# Patient Record
Sex: FEMALE | Race: WHITE | Employment: OTHER | URBAN - NONMETROPOLITAN AREA
[De-identification: names, ages, dates, MRNs, and addresses within clinical notes are randomized per-mention and may not be internally consistent; named-entity substitution may affect disease eponyms.]

---

## 2017-09-06 ENCOUNTER — DOCTOR'S OFFICE (OUTPATIENT)
Dept: URBAN - NONMETROPOLITAN AREA CLINIC 1 | Facility: CLINIC | Age: 77
Setting detail: OPHTHALMOLOGY
End: 2017-09-06
Payer: COMMERCIAL

## 2017-09-06 DIAGNOSIS — H04.123: ICD-10-CM

## 2017-09-06 DIAGNOSIS — H02.052: ICD-10-CM

## 2017-09-06 DIAGNOSIS — H40.003: ICD-10-CM

## 2017-09-06 DIAGNOSIS — H43.813: ICD-10-CM

## 2017-09-06 DIAGNOSIS — H04.122: ICD-10-CM

## 2017-09-06 DIAGNOSIS — H35.3131: ICD-10-CM

## 2017-09-06 PROCEDURE — 92250 FUNDUS PHOTOGRAPHY W/I&R: CPT | Performed by: OPHTHALMOLOGY

## 2017-09-06 PROCEDURE — 83861 MICROFLUID ANALY TEARS: CPT | Performed by: OPHTHALMOLOGY

## 2017-09-06 PROCEDURE — 68761 CLOSE TEAR DUCT OPENING: CPT | Performed by: OPHTHALMOLOGY

## 2017-09-06 PROCEDURE — 67820 REVISE EYELASHES: CPT | Performed by: OPHTHALMOLOGY

## 2017-09-06 PROCEDURE — 92014 COMPRE OPH EXAM EST PT 1/>: CPT | Performed by: OPHTHALMOLOGY

## 2017-09-06 PROCEDURE — 92134 CPTRZ OPH DX IMG PST SGM RTA: CPT | Performed by: OPHTHALMOLOGY

## 2017-09-06 PROCEDURE — 76514 ECHO EXAM OF EYE THICKNESS: CPT | Performed by: OPHTHALMOLOGY

## 2017-09-06 ASSESSMENT — REFRACTION_CURRENTRX
OS_VPRISM_DIRECTION: PROGS
OD_CYLINDER: -1.00
OD_OVR_VA: 20/
OD_SPHERE: +0.25
OS_AXIS: 82
OD_ADD: +2.50
OS_OVR_VA: 20/
OS_ADD: +2.50
OS_CYLINDER: -1.25
OS_SPHERE: PLANO
OD_OVR_VA: 20/
OD_VPRISM_DIRECTION: PROGS
OD_OVR_VA: 20/
OD_AXIS: 85

## 2017-09-06 ASSESSMENT — VISUAL ACUITY
OD_BCVA: 20/25
OS_BCVA: 20/25-2

## 2017-09-06 ASSESSMENT — REFRACTION_MANIFEST
OD_VA1: 20/
OS_VA2: 20/
OS_VA2: 20/25
OS_VA3: 20/
OS_VA1: 20/25-2
OD_VA3: 20/
OD_CYLINDER: -1.00
OS_VA3: 20/
OS_VA1: 20/
OD_ADD: +2.50
OD_VA3: 20/
OU_VA: 20/
OS_AXIS: 085
OS_VA2: 20/
OS_VA3: 20/
OS_CYLINDER: -1.00
OD_VA3: 20/
OD_AXIS: 080
OS_ADD: +2.50
OD_VA1: 20/25-2
OU_VA: 20/
OD_VA2: 20/25
OD_VA1: 20/
OS_VA1: 20/
OD_SPHERE: +0.25
OU_VA: 20/
OS_SPHERE: PLANO
OD_VA2: 20/
OD_VA2: 20/

## 2017-09-06 ASSESSMENT — REFRACTION_AUTOREFRACTION
OS_CYLINDER: -2.50
OS_AXIS: 83
OD_CYLINDER: -2.25
OD_SPHERE: +1.25
OS_SPHERE: +1.75
OD_AXIS: 85

## 2017-09-06 ASSESSMENT — SPHEQUIV_DERIVED
OD_SPHEQUIV: 0.125
OD_SPHEQUIV: -0.25
OS_SPHEQUIV: 0.5

## 2017-09-06 ASSESSMENT — PACHYMETRY
OS_CT_CORRECTION: 1
OD_CT_CORRECTION: 1
OS_CT_UM: 527
OD_CT_UM: 531

## 2017-09-06 ASSESSMENT — DRY EYES - PHYSICIAN NOTES
OD_GENERALCOMMENTS: K SICCA MILD
OS_GENERALCOMMENTS: K SICCA MILD

## 2017-09-06 ASSESSMENT — CONFRONTATIONAL VISUAL FIELD TEST (CVF)
OD_FINDINGS: FULL
OS_FINDINGS: FULL

## 2017-09-06 ASSESSMENT — LID POSITION - DERMATOCHALASIS
OS_DERMATOCHALASIS: LUL 1+
OD_DERMATOCHALASIS: RUL 1+

## 2017-09-06 ASSESSMENT — CORNEAL DYSTROPHY - BAND KERATOPATHY
OD_BANDKERATOPATHY: 1+
OS_BANDKERATOPATHY: 1+

## 2017-09-06 ASSESSMENT — SUPERFICIAL PUNCTATE KERATITIS (SPK)
OD_SPK: 2+
OS_SPK: 2+

## 2018-03-06 ENCOUNTER — DOCTOR'S OFFICE (OUTPATIENT)
Dept: URBAN - NONMETROPOLITAN AREA CLINIC 1 | Facility: CLINIC | Age: 78
Setting detail: OPHTHALMOLOGY
End: 2018-03-06
Payer: COMMERCIAL

## 2018-03-06 DIAGNOSIS — H04.123: ICD-10-CM

## 2018-03-06 DIAGNOSIS — H40.013: ICD-10-CM

## 2018-03-06 DIAGNOSIS — H04.122: ICD-10-CM

## 2018-03-06 DIAGNOSIS — H04.121: ICD-10-CM

## 2018-03-06 DIAGNOSIS — H35.3131: ICD-10-CM

## 2018-03-06 DIAGNOSIS — H43.813: ICD-10-CM

## 2018-03-06 DIAGNOSIS — H02.052: ICD-10-CM

## 2018-03-06 DIAGNOSIS — H27.8: ICD-10-CM

## 2018-03-06 PROCEDURE — 92134 CPTRZ OPH DX IMG PST SGM RTA: CPT | Performed by: OPHTHALMOLOGY

## 2018-03-06 PROCEDURE — 83861 MICROFLUID ANALY TEARS: CPT | Performed by: OPHTHALMOLOGY

## 2018-03-06 PROCEDURE — 92083 EXTENDED VISUAL FIELD XM: CPT | Performed by: OPHTHALMOLOGY

## 2018-03-06 PROCEDURE — 92014 COMPRE OPH EXAM EST PT 1/>: CPT | Performed by: OPHTHALMOLOGY

## 2018-03-06 ASSESSMENT — REFRACTION_CURRENTRX
OD_OVR_VA: 20/
OD_OVR_VA: 20/
OD_ADD: +2.50
OD_AXIS: 85
OD_SPHERE: +0.25
OD_VPRISM_DIRECTION: PROGS
OS_AXIS: 82
OD_CYLINDER: -1.00
OS_OVR_VA: 20/
OS_SPHERE: PLANO
OS_CYLINDER: -1.25
OS_OVR_VA: 20/
OD_OVR_VA: 20/
OS_VPRISM_DIRECTION: PROGS
OS_OVR_VA: 20/
OS_ADD: +2.50

## 2018-03-06 ASSESSMENT — LID POSITION - DERMATOCHALASIS
OD_DERMATOCHALASIS: RUL 1+
OS_DERMATOCHALASIS: LUL 1+

## 2018-03-06 ASSESSMENT — REFRACTION_MANIFEST
OS_VA1: 20/
OD_CYLINDER: -1.00
OD_SPHERE: +0.25
OD_VA1: 20/25-2
OS_VA3: 20/
OS_VA2: 20/
OS_VA2: 20/
OS_ADD: +2.50
OD_VA2: 20/25
OD_ADD: +2.50
OS_VA1: 20/25-2
OU_VA: 20/
OD_AXIS: 080
OD_VA2: 20/
OS_SPHERE: PLANO
OS_AXIS: 085
OD_VA3: 20/
OU_VA: 20/
OD_VA3: 20/
OD_VA3: 20/
OS_VA3: 20/
OS_VA1: 20/
OD_VA2: 20/
OS_VA2: 20/25
OD_VA1: 20/
OS_CYLINDER: -1.00
OD_VA1: 20/
OU_VA: 20/
OS_VA3: 20/

## 2018-03-06 ASSESSMENT — CORNEAL DYSTROPHY - BAND KERATOPATHY
OD_BANDKERATOPATHY: 1+
OS_BANDKERATOPATHY: 1+

## 2018-03-06 ASSESSMENT — REFRACTION_AUTOREFRACTION
OS_SPHERE: +2.25
OD_SPHERE: +1.75
OD_CYLINDER: -2.00
OS_CYLINDER: -1.75
OS_AXIS: 088
OD_AXIS: 080

## 2018-03-06 ASSESSMENT — SPHEQUIV_DERIVED
OD_SPHEQUIV: -0.25
OD_SPHEQUIV: 0.75
OS_SPHEQUIV: 1.375

## 2018-03-06 ASSESSMENT — DRY EYES - PHYSICIAN NOTES
OS_GENERALCOMMENTS: K SICCA MILD
OD_GENERALCOMMENTS: K SICCA MILD

## 2018-03-06 ASSESSMENT — VISUAL ACUITY
OD_BCVA: 20/40
OS_BCVA: 20/30

## 2018-03-06 ASSESSMENT — CONFRONTATIONAL VISUAL FIELD TEST (CVF)
OD_FINDINGS: FULL
OS_FINDINGS: FULL

## 2018-03-06 ASSESSMENT — SUPERFICIAL PUNCTATE KERATITIS (SPK)
OS_SPK: 2+
OD_SPK: 2+

## 2018-09-19 ENCOUNTER — DOCTOR'S OFFICE (OUTPATIENT)
Dept: URBAN - NONMETROPOLITAN AREA CLINIC 1 | Facility: CLINIC | Age: 78
Setting detail: OPHTHALMOLOGY
End: 2018-09-19
Payer: COMMERCIAL

## 2018-09-19 DIAGNOSIS — H40.013: ICD-10-CM

## 2018-09-19 DIAGNOSIS — H04.123: ICD-10-CM

## 2018-09-19 DIAGNOSIS — H35.3131: ICD-10-CM

## 2018-09-19 DIAGNOSIS — H43.813: ICD-10-CM

## 2018-09-19 PROBLEM — H02.052 TRICHIASIS, WITHOUT ENTROPION; RIGHT LOWER LID: Status: RESOLVED | Noted: 2017-09-06 | Resolved: 2018-09-19

## 2018-09-19 PROCEDURE — 92014 COMPRE OPH EXAM EST PT 1/>: CPT | Performed by: OPHTHALMOLOGY

## 2018-09-19 PROCEDURE — 92133 CPTRZD OPH DX IMG PST SGM ON: CPT | Performed by: OPHTHALMOLOGY

## 2018-09-19 ASSESSMENT — REFRACTION_AUTOREFRACTION
OD_AXIS: 177
OD_SPHERE: +3.50
OS_CYLINDER: -1.50
OS_SPHERE: +3.00
OD_CYLINDER: -1.25
OS_AXIS: 070

## 2018-09-19 ASSESSMENT — REFRACTION_MANIFEST
OS_VA2: 20/
OD_VA3: 20/
OS_VA1: 20/
OD_VA1: 20/25-2
OD_VA2: 20/25
OS_ADD: +2.50
OS_VA3: 20/
OD_VA1: 20/
OU_VA: 20/
OD_CYLINDER: -1.00
OS_CYLINDER: -1.00
OD_ADD: +2.50
OS_VA2: 20/25
OS_AXIS: 085
OD_VA2: 20/
OU_VA: 20/
OS_VA1: 20/25-2
OS_VA3: 20/
OD_VA3: 20/
OD_AXIS: 080
OD_SPHERE: +0.25
OS_SPHERE: PLANO

## 2018-09-19 ASSESSMENT — VISUAL ACUITY
OS_BCVA: 20/50+2
OD_BCVA: 20/60-1

## 2018-09-19 ASSESSMENT — CORNEAL DYSTROPHY - BAND KERATOPATHY
OS_BANDKERATOPATHY: 1+
OD_BANDKERATOPATHY: 1+

## 2018-09-19 ASSESSMENT — SUPERFICIAL PUNCTATE KERATITIS (SPK)
OS_SPK: 2+
OD_SPK: 2+

## 2018-09-19 ASSESSMENT — REFRACTION_CURRENTRX
OD_OVR_VA: 20/
OS_OVR_VA: 20/
OD_VPRISM_DIRECTION: PROGS
OS_SPHERE: PLANO
OD_SPHERE: +0.25
OS_OVR_VA: 20/
OS_CYLINDER: -1.25
OS_ADD: +2.50
OD_OVR_VA: 20/
OS_VPRISM_DIRECTION: PROGS
OS_AXIS: 82
OD_AXIS: 85
OD_ADD: +2.50
OS_OVR_VA: 20/
OD_OVR_VA: 20/
OD_CYLINDER: -1.00

## 2018-09-19 ASSESSMENT — CORNEAL DYSTROPHY
OD_DYSTROPHY: ARCUS SENILIS
OS_DYSTROPHY: ARCUS SENILIS

## 2018-09-19 ASSESSMENT — SPHEQUIV_DERIVED
OD_SPHEQUIV: 2.875
OD_SPHEQUIV: -0.25
OS_SPHEQUIV: 2.25

## 2018-09-19 ASSESSMENT — CONFRONTATIONAL VISUAL FIELD TEST (CVF)
OS_FINDINGS: FULL
OD_FINDINGS: FULL

## 2018-09-19 ASSESSMENT — DRY EYES - PHYSICIAN NOTES
OS_GENERALCOMMENTS: K SICCA MILD
OD_GENERALCOMMENTS: K SICCA MILD

## 2018-09-19 ASSESSMENT — LID POSITION - DERMATOCHALASIS
OS_DERMATOCHALASIS: LUL 1+
OD_DERMATOCHALASIS: RUL 1+

## 2018-09-21 ENCOUNTER — OPTICAL (OUTPATIENT)
Dept: URBAN - NONMETROPOLITAN AREA CLINIC 6 | Facility: CLINIC | Age: 78
Setting detail: OPHTHALMOLOGY
End: 2018-09-21
Payer: COMMERCIAL

## 2018-09-21 ENCOUNTER — DOCTOR'S OFFICE (OUTPATIENT)
Dept: URBAN - NONMETROPOLITAN AREA CLINIC 1 | Facility: CLINIC | Age: 78
Setting detail: OPHTHALMOLOGY
End: 2018-09-21

## 2018-09-21 DIAGNOSIS — H52.4: ICD-10-CM

## 2018-09-21 DIAGNOSIS — H27.8: ICD-10-CM

## 2018-09-21 DIAGNOSIS — Z96.1: ICD-10-CM

## 2018-09-21 PROCEDURE — V2203 LENS SPHCYL BIFOCAL 4.00D/.1: HCPCS | Performed by: OPTOMETRIST

## 2018-09-21 PROCEDURE — V2781 PROGRESSIVE LENS PER LENS: HCPCS | Performed by: OPTOMETRIST

## 2018-09-21 PROCEDURE — V2020 VISION SVCS FRAMES PURCHASES: HCPCS | Performed by: OPTOMETRIST

## 2018-09-21 PROCEDURE — 92015 DETERMINE REFRACTIVE STATE: CPT | Performed by: OPTOMETRIST

## 2018-09-21 ASSESSMENT — SPHEQUIV_DERIVED
OS_SPHEQUIV: 0.125
OD_SPHEQUIV: 0.625
OS_SPHEQUIV: 0
OD_SPHEQUIV: 1

## 2018-09-21 ASSESSMENT — REFRACTION_MANIFEST
OD_CYLINDER: -1.75
OS_CYLINDER: -1.00
OD_VA3: 20/
OS_ADD: +2.50
OS_AXIS: 085
OS_VA1: 20/25-2
OD_ADD: +2.50
OS_VA2: 20/25
OS_VA3: 20/
OS_VA3: 20/
OD_SPHERE: +1.50
OU_VA: 20/
OD_VA3: 20/
OD_VA1: 20/30-2
OD_VA2: 20/30
OD_AXIS: 095
OS_VA2: 20/
OD_VA1: 20/
OD_VA2: 20/
OU_VA: 20/
OS_VA1: 20/
OS_SPHERE: +0.50

## 2018-09-21 ASSESSMENT — KERATOMETRY
OS_K1POWER_DIOPTERS: 42.25
OS_AXISANGLE_DEGREES: 089
OD_AXISANGLE_DEGREES: 063
OS_K2POWER_DIOPTERS: 43.75
OD_K2POWER_DIOPTERS: 43.50
OD_K1POWER_DIOPTERS: 43.00

## 2018-09-21 ASSESSMENT — REFRACTION_AUTOREFRACTION
OS_CYLINDER: -1.25
OD_SPHERE: +2.50
OS_AXIS: 078
OS_SPHERE: +0.75
OD_AXIS: 099
OD_CYLINDER: -3.00

## 2018-09-21 ASSESSMENT — REFRACTION_CURRENTRX
OS_SPHERE: PLANO
OS_VPRISM_DIRECTION: PROGS
OS_OVR_VA: 20/
OD_OVR_VA: 20/
OS_OVR_VA: 20/
OD_SPHERE: +0.25
OS_OVR_VA: 20/
OS_ADD: +2.50
OD_AXIS: 080
OD_ADD: +2.50
OD_OVR_VA: 20/
OS_AXIS: 085
OS_CYLINDER: -100.00
OD_CYLINDER: -1.00
OD_VPRISM_DIRECTION: PROGS
OD_OVR_VA: 20/

## 2018-09-21 ASSESSMENT — AXIALLENGTH_DERIVED
OD_AL: 23.4412
OS_AL: 23.7276
OD_AL: 23.2978
OS_AL: 23.777

## 2018-09-21 ASSESSMENT — VISUAL ACUITY
OD_BCVA: 20/30
OS_BCVA: 20/30

## 2018-11-20 ENCOUNTER — DOCTOR'S OFFICE (OUTPATIENT)
Dept: URBAN - NONMETROPOLITAN AREA CLINIC 1 | Facility: CLINIC | Age: 78
Setting detail: OPHTHALMOLOGY
End: 2018-11-20
Payer: COMMERCIAL

## 2018-11-20 DIAGNOSIS — H04.123: ICD-10-CM

## 2018-11-20 DIAGNOSIS — H04.121: ICD-10-CM

## 2018-11-20 DIAGNOSIS — H40.013: ICD-10-CM

## 2018-11-20 DIAGNOSIS — H35.3131: ICD-10-CM

## 2018-11-20 DIAGNOSIS — H04.122: ICD-10-CM

## 2018-11-20 DIAGNOSIS — H43.813: ICD-10-CM

## 2018-11-20 PROCEDURE — 92083 EXTENDED VISUAL FIELD XM: CPT | Performed by: OPHTHALMOLOGY

## 2018-11-20 PROCEDURE — 92014 COMPRE OPH EXAM EST PT 1/>: CPT | Performed by: OPHTHALMOLOGY

## 2018-11-20 PROCEDURE — 92134 CPTRZ OPH DX IMG PST SGM RTA: CPT | Performed by: OPHTHALMOLOGY

## 2018-11-20 PROCEDURE — 83861 MICROFLUID ANALY TEARS: CPT | Performed by: OPHTHALMOLOGY

## 2018-11-20 PROCEDURE — 76514 ECHO EXAM OF EYE THICKNESS: CPT | Performed by: OPHTHALMOLOGY

## 2018-11-20 ASSESSMENT — PACHYMETRY
OD_CT_CORRECTION: 1
OD_CT_UM: 531
OS_CT_UM: 527
OS_CT_CORRECTION: 1

## 2018-11-20 ASSESSMENT — REFRACTION_MANIFEST
OD_CYLINDER: -1.75
OS_VA2: 20/
OS_VA2: 20/25
OD_AXIS: 095
OU_VA: 20/
OD_VA2: 20/
OS_VA3: 20/
OS_SPHERE: +0.50
OS_CYLINDER: -1.00
OS_VA1: 20/25-2
OD_VA1: 20/30-2
OU_VA: 20/
OD_VA3: 20/
OS_ADD: +2.50
OD_VA3: 20/
OS_VA3: 20/
OD_VA1: 20/
OD_ADD: +2.50
OS_AXIS: 085
OS_VA1: 20/
OD_VA2: 20/30
OD_SPHERE: +1.50

## 2018-11-20 ASSESSMENT — REFRACTION_CURRENTRX
OS_OVR_VA: 20/
OS_AXIS: 085
OS_OVR_VA: 20/
OS_ADD: +2.50
OS_SPHERE: +0.50
OD_OVR_VA: 20/
OD_OVR_VA: 20/
OS_OVR_VA: 20/
OD_AXIS: 095
OD_ADD: +2.50
OS_CYLINDER: -1.00
OD_CYLINDER: -1.75
OD_VPRISM_DIRECTION: PROGS
OD_OVR_VA: 20/
OD_SPHERE: +1.50
OS_VPRISM_DIRECTION: PROGS

## 2018-11-20 ASSESSMENT — VISUAL ACUITY
OS_BCVA: 20/30
OD_BCVA: 20/30+2

## 2018-11-20 ASSESSMENT — SPHEQUIV_DERIVED
OD_SPHEQUIV: 3.25
OS_SPHEQUIV: 0
OS_SPHEQUIV: 0.125
OD_SPHEQUIV: 0.625

## 2018-11-20 ASSESSMENT — KERATOMETRY
OS_K2POWER_DIOPTERS: 43.75
OD_K2POWER_DIOPTERS: 43.50
OD_AXISANGLE_DEGREES: 063
OD_K1POWER_DIOPTERS: 43.00
OS_AXISANGLE_DEGREES: 089
OS_K1POWER_DIOPTERS: 42.25

## 2018-11-20 ASSESSMENT — SUPERFICIAL PUNCTATE KERATITIS (SPK)
OS_SPK: 2+
OD_SPK: 2+

## 2018-11-20 ASSESSMENT — REFRACTION_AUTOREFRACTION
OS_SPHERE: +0.75
OD_AXIS: 062
OD_CYLINDER: -3.00
OS_CYLINDER: -1.25
OD_SPHERE: +4.75
OS_AXIS: 078

## 2018-11-20 ASSESSMENT — AXIALLENGTH_DERIVED
OS_AL: 23.777
OD_AL: 23.4412
OD_AL: 22.4732
OS_AL: 23.7276

## 2018-11-20 ASSESSMENT — CONFRONTATIONAL VISUAL FIELD TEST (CVF)
OS_FINDINGS: FULL
OD_FINDINGS: FULL

## 2018-11-20 ASSESSMENT — CORNEAL DYSTROPHY - BAND KERATOPATHY
OD_BANDKERATOPATHY: 1+
OS_BANDKERATOPATHY: 1+

## 2018-11-20 ASSESSMENT — LID POSITION - DERMATOCHALASIS
OD_DERMATOCHALASIS: RUL 1+
OS_DERMATOCHALASIS: LUL 1+

## 2018-11-20 ASSESSMENT — CORNEAL DYSTROPHY
OD_DYSTROPHY: ARCUS SENILIS
OS_DYSTROPHY: ARCUS SENILIS

## 2018-11-20 ASSESSMENT — DRY EYES - PHYSICIAN NOTES
OS_GENERALCOMMENTS: K SICCA MILD
OD_GENERALCOMMENTS: K SICCA MILD

## 2018-12-19 ENCOUNTER — DOCTOR'S OFFICE (OUTPATIENT)
Dept: URBAN - NONMETROPOLITAN AREA CLINIC 1 | Facility: CLINIC | Age: 78
Setting detail: OPHTHALMOLOGY
End: 2018-12-19
Payer: COMMERCIAL

## 2018-12-19 DIAGNOSIS — H04.123: ICD-10-CM

## 2018-12-19 DIAGNOSIS — H02.831: ICD-10-CM

## 2018-12-19 DIAGNOSIS — H43.813: ICD-10-CM

## 2018-12-19 DIAGNOSIS — H27.8: ICD-10-CM

## 2018-12-19 DIAGNOSIS — H40.013: ICD-10-CM

## 2018-12-19 DIAGNOSIS — H04.122: ICD-10-CM

## 2018-12-19 DIAGNOSIS — H04.121: ICD-10-CM

## 2018-12-19 PROCEDURE — 83861 MICROFLUID ANALY TEARS: CPT | Performed by: OPHTHALMOLOGY

## 2018-12-19 PROCEDURE — 68761 CLOSE TEAR DUCT OPENING: CPT | Performed by: OPHTHALMOLOGY

## 2018-12-19 PROCEDURE — 92012 INTRM OPH EXAM EST PATIENT: CPT | Performed by: OPHTHALMOLOGY

## 2018-12-19 ASSESSMENT — SUPERFICIAL PUNCTATE KERATITIS (SPK)
OD_SPK: 2+
OS_SPK: 2+

## 2018-12-19 ASSESSMENT — SPHEQUIV_DERIVED
OS_SPHEQUIV: 0
OD_SPHEQUIV: 0.625
OS_SPHEQUIV: 0.125
OD_SPHEQUIV: 3.25

## 2018-12-19 ASSESSMENT — REFRACTION_MANIFEST
OS_VA3: 20/
OS_VA2: 20/25
OS_AXIS: 085
OD_CYLINDER: -1.75
OS_VA3: 20/
OD_SPHERE: +1.50
OD_ADD: +2.50
OD_VA3: 20/
OD_VA1: 20/
OU_VA: 20/
OS_CYLINDER: -1.00
OD_AXIS: 095
OD_VA2: 20/30
OS_VA2: 20/
OS_ADD: +2.50
OU_VA: 20/
OS_VA1: 20/
OS_VA1: 20/25-2
OD_VA2: 20/
OD_VA3: 20/
OD_VA1: 20/30-2
OS_SPHERE: +0.50

## 2018-12-19 ASSESSMENT — DRY EYES - PHYSICIAN NOTES
OD_GENERALCOMMENTS: K SICCA MILD
OS_GENERALCOMMENTS: K SICCA MILD

## 2018-12-19 ASSESSMENT — REFRACTION_CURRENTRX
OD_OVR_VA: 20/
OD_VPRISM_DIRECTION: PROGS
OS_OVR_VA: 20/
OS_ADD: +2.50
OS_CYLINDER: -1.00
OD_ADD: +2.50
OD_SPHERE: +1.50
OD_OVR_VA: 20/
OD_OVR_VA: 20/
OD_AXIS: 095
OS_OVR_VA: 20/
OS_SPHERE: +0.50
OD_CYLINDER: -1.75
OS_AXIS: 085
OS_VPRISM_DIRECTION: PROGS
OS_OVR_VA: 20/

## 2018-12-19 ASSESSMENT — CORNEAL DYSTROPHY
OD_DYSTROPHY: ARCUS SENILIS
OS_DYSTROPHY: ARCUS SENILIS

## 2018-12-19 ASSESSMENT — VISUAL ACUITY
OD_BCVA: 20/30
OS_BCVA: 20/30-2

## 2018-12-19 ASSESSMENT — LID POSITION - DERMATOCHALASIS
OD_DERMATOCHALASIS: RUL 1+
OS_DERMATOCHALASIS: LUL 1+

## 2018-12-19 ASSESSMENT — CORNEAL DYSTROPHY - BAND KERATOPATHY
OD_BANDKERATOPATHY: 1+
OS_BANDKERATOPATHY: 1+

## 2018-12-19 ASSESSMENT — LID EXAM ASSESSMENTS
OD_BLEPHARITIS: RLL RUL
OS_BLEPHARITIS: LLL LUL

## 2018-12-19 ASSESSMENT — CONFRONTATIONAL VISUAL FIELD TEST (CVF)
OD_FINDINGS: FULL
OS_FINDINGS: FULL

## 2018-12-19 ASSESSMENT — REFRACTION_AUTOREFRACTION
OD_SPHERE: +4.75
OS_CYLINDER: -1.25
OD_CYLINDER: -3.00
OS_SPHERE: +0.75
OS_AXIS: 078
OD_AXIS: 062

## 2019-03-20 ENCOUNTER — DOCTOR'S OFFICE (OUTPATIENT)
Dept: URBAN - NONMETROPOLITAN AREA CLINIC 1 | Facility: CLINIC | Age: 79
Setting detail: OPHTHALMOLOGY
End: 2019-03-20
Payer: COMMERCIAL

## 2019-03-20 DIAGNOSIS — H40.013: ICD-10-CM

## 2019-03-20 DIAGNOSIS — H04.122: ICD-10-CM

## 2019-03-20 DIAGNOSIS — H04.123: ICD-10-CM

## 2019-03-20 DIAGNOSIS — H35.3131: ICD-10-CM

## 2019-03-20 DIAGNOSIS — H04.121: ICD-10-CM

## 2019-03-20 PROCEDURE — 92014 COMPRE OPH EXAM EST PT 1/>: CPT | Performed by: OPHTHALMOLOGY

## 2019-03-20 PROCEDURE — 83861 MICROFLUID ANALY TEARS: CPT | Performed by: OPHTHALMOLOGY

## 2019-03-20 PROCEDURE — 92133 CPTRZD OPH DX IMG PST SGM ON: CPT | Performed by: OPHTHALMOLOGY

## 2019-03-20 ASSESSMENT — REFRACTION_CURRENTRX
OS_OVR_VA: 20/
OD_ADD: +2.50
OS_CYLINDER: -1.00
OD_OVR_VA: 20/
OS_VPRISM_DIRECTION: PROGS
OD_SPHERE: +1.50
OS_SPHERE: +0.50
OS_OVR_VA: 20/
OD_OVR_VA: 20/
OD_AXIS: 095
OS_ADD: +2.50
OD_CYLINDER: -1.75
OS_OVR_VA: 20/
OD_VPRISM_DIRECTION: PROGS
OD_OVR_VA: 20/
OS_AXIS: 085

## 2019-03-20 ASSESSMENT — REFRACTION_MANIFEST
OS_AXIS: 085
OU_VA: 20/
OD_VA1: 20/
OU_VA: 20/
OS_VA2: 20/
OS_VA3: 20/
OS_VA1: 20/
OD_VA3: 20/
OD_VA1: 20/30-2
OS_ADD: +2.50
OS_CYLINDER: -1.00
OD_VA2: 20/
OS_VA2: 20/25
OD_VA2: 20/30
OS_VA1: 20/25-2
OS_SPHERE: +0.50
OD_CYLINDER: -1.75
OD_SPHERE: +1.50
OD_VA3: 20/
OD_ADD: +2.50
OD_AXIS: 095
OS_VA3: 20/

## 2019-03-20 ASSESSMENT — CORNEAL DYSTROPHY - BAND KERATOPATHY
OD_BANDKERATOPATHY: 1+
OS_BANDKERATOPATHY: 1+

## 2019-03-20 ASSESSMENT — LID EXAM ASSESSMENTS
OD_BLEPHARITIS: RLL RUL
OS_BLEPHARITIS: LLL LUL

## 2019-03-20 ASSESSMENT — LID POSITION - DERMATOCHALASIS
OS_DERMATOCHALASIS: LUL 1+
OD_DERMATOCHALASIS: RUL 1+

## 2019-03-20 ASSESSMENT — REFRACTION_AUTOREFRACTION
OS_CYLINDER: -1.25
OS_SPHERE: +0.75
OD_SPHERE: +4.75
OD_AXIS: 062
OS_AXIS: 078
OD_CYLINDER: -3.00

## 2019-03-20 ASSESSMENT — CORNEAL DYSTROPHY
OD_DYSTROPHY: ARCUS SENILIS
OS_DYSTROPHY: ARCUS SENILIS

## 2019-03-20 ASSESSMENT — SPHEQUIV_DERIVED
OS_SPHEQUIV: 0
OD_SPHEQUIV: 3.25
OS_SPHEQUIV: 0.125
OD_SPHEQUIV: 0.625

## 2019-03-20 ASSESSMENT — CONFRONTATIONAL VISUAL FIELD TEST (CVF)
OS_FINDINGS: FULL
OD_FINDINGS: FULL

## 2019-03-20 ASSESSMENT — DRY EYES - PHYSICIAN NOTES
OS_GENERALCOMMENTS: LOW TEAR FILM
OD_GENERALCOMMENTS: LOW TEAR FILM

## 2019-03-20 ASSESSMENT — SUPERFICIAL PUNCTATE KERATITIS (SPK): OS_SPK: 2+

## 2019-03-20 ASSESSMENT — VISUAL ACUITY
OS_BCVA: 20/30-2
OD_BCVA: 20/30-2

## 2019-09-25 ENCOUNTER — RX ONLY (RX ONLY)
Age: 79
End: 2019-09-25

## 2019-09-25 ENCOUNTER — DOCTOR'S OFFICE (OUTPATIENT)
Dept: URBAN - NONMETROPOLITAN AREA CLINIC 1 | Facility: CLINIC | Age: 79
Setting detail: OPHTHALMOLOGY
End: 2019-09-25
Payer: COMMERCIAL

## 2019-09-25 DIAGNOSIS — H40.013: ICD-10-CM

## 2019-09-25 DIAGNOSIS — H04.123: ICD-10-CM

## 2019-09-25 DIAGNOSIS — H43.813: ICD-10-CM

## 2019-09-25 DIAGNOSIS — H04.122: ICD-10-CM

## 2019-09-25 DIAGNOSIS — H04.121: ICD-10-CM

## 2019-09-25 DIAGNOSIS — H35.3131: ICD-10-CM

## 2019-09-25 PROCEDURE — 92083 EXTENDED VISUAL FIELD XM: CPT | Performed by: OPHTHALMOLOGY

## 2019-09-25 PROCEDURE — 92014 COMPRE OPH EXAM EST PT 1/>: CPT | Performed by: OPHTHALMOLOGY

## 2019-09-25 PROCEDURE — 83861 MICROFLUID ANALY TEARS: CPT | Performed by: OPHTHALMOLOGY

## 2019-09-25 PROCEDURE — 92134 CPTRZ OPH DX IMG PST SGM RTA: CPT | Performed by: OPHTHALMOLOGY

## 2019-09-25 ASSESSMENT — REFRACTION_CURRENTRX
OS_ADD: +2.50
OD_OVR_VA: 20/
OD_AXIS: 095
OD_CYLINDER: -1.75
OS_AXIS: 085
OD_OVR_VA: 20/
OD_ADD: +2.50
OD_VPRISM_DIRECTION: PROGS
OS_CYLINDER: -1.00
OD_SPHERE: +1.50
OD_OVR_VA: 20/
OS_OVR_VA: 20/
OS_SPHERE: +0.50
OS_VPRISM_DIRECTION: PROGS

## 2019-09-25 ASSESSMENT — REFRACTION_AUTOREFRACTION
OS_SPHERE: +2.00
OS_CYLINDER: -3.00
OD_AXIS: 102
OD_CYLINDER: -2.75
OD_SPHERE: +1.75
OS_AXIS: 84

## 2019-09-25 ASSESSMENT — KERATOMETRY
OS_K1POWER_DIOPTERS: 42.25
OS_AXISANGLE_DEGREES: 089
OS_K2POWER_DIOPTERS: 43.75
OD_K2POWER_DIOPTERS: 43.50
OD_AXISANGLE_DEGREES: 063
OD_K1POWER_DIOPTERS: 43.00

## 2019-09-25 ASSESSMENT — AXIALLENGTH_DERIVED
OS_AL: 23.5808
OD_AL: 23.4412
OS_AL: 23.777
OD_AL: 23.5378

## 2019-09-25 ASSESSMENT — REFRACTION_MANIFEST
OD_VA2: 20/
OS_ADD: +2.50
OS_AXIS: 085
OS_CYLINDER: -1.00
OS_VA2: 20/25
OD_CYLINDER: -1.75
OD_VA2: 20/30
OD_ADD: +2.50
OS_VA2: 20/
OS_SPHERE: +0.50
OS_VA1: 20/
OD_SPHERE: +1.50
OD_VA3: 20/
OD_VA1: 20/30-2
OS_VA3: 20/
OD_VA3: 20/
OD_AXIS: 095
OU_VA: 20/
OS_VA1: 20/25-2
OS_VA3: 20/
OD_VA1: 20/
OU_VA: 20/

## 2019-09-25 ASSESSMENT — VISUAL ACUITY
OD_BCVA: 20/30
OS_BCVA: 20/40+2

## 2019-09-25 ASSESSMENT — CORNEAL DYSTROPHY
OS_DYSTROPHY: ARCUS SENILIS
OD_DYSTROPHY: ARCUS SENILIS

## 2019-09-25 ASSESSMENT — LID POSITION - DERMATOCHALASIS
OD_DERMATOCHALASIS: RUL 1+
OS_DERMATOCHALASIS: LUL 1+

## 2019-09-25 ASSESSMENT — LID EXAM ASSESSMENTS
OS_BLEPHARITIS: LLL LUL
OD_BLEPHARITIS: RLL RUL

## 2019-09-25 ASSESSMENT — CONFRONTATIONAL VISUAL FIELD TEST (CVF)
OD_FINDINGS: FULL
OS_FINDINGS: FULL

## 2019-09-25 ASSESSMENT — CORNEAL DYSTROPHY - BAND KERATOPATHY
OS_BANDKERATOPATHY: 1+
OD_BANDKERATOPATHY: 1+

## 2019-09-25 ASSESSMENT — DRY EYES - PHYSICIAN NOTES
OD_GENERALCOMMENTS: LOW TEAR FILM
OS_GENERALCOMMENTS: LOW TEAR FILM

## 2019-09-25 ASSESSMENT — SPHEQUIV_DERIVED
OS_SPHEQUIV: 0
OD_SPHEQUIV: 0.625
OS_SPHEQUIV: 0.5
OD_SPHEQUIV: 0.375

## 2019-09-25 ASSESSMENT — SUPERFICIAL PUNCTATE KERATITIS (SPK): OS_SPK: 2+

## 2020-06-24 ENCOUNTER — DOCTOR'S OFFICE (OUTPATIENT)
Dept: URBAN - NONMETROPOLITAN AREA CLINIC 1 | Facility: CLINIC | Age: 80
Setting detail: OPHTHALMOLOGY
End: 2020-06-24
Payer: COMMERCIAL

## 2020-06-24 VITALS — HEIGHT: 55 IN

## 2020-06-24 DIAGNOSIS — H43.813: ICD-10-CM

## 2020-06-24 DIAGNOSIS — H04.123: ICD-10-CM

## 2020-06-24 DIAGNOSIS — H35.3131: ICD-10-CM

## 2020-06-24 DIAGNOSIS — H40.013: ICD-10-CM

## 2020-06-24 PROCEDURE — 76514 ECHO EXAM OF EYE THICKNESS: CPT | Performed by: OPHTHALMOLOGY

## 2020-06-24 PROCEDURE — 92133 CPTRZD OPH DX IMG PST SGM ON: CPT | Performed by: OPHTHALMOLOGY

## 2020-06-24 PROCEDURE — 92014 COMPRE OPH EXAM EST PT 1/>: CPT | Performed by: OPHTHALMOLOGY

## 2020-06-24 ASSESSMENT — REFRACTION_CURRENTRX
OS_AXIS: 085
OS_ADD: +2.50
OS_VPRISM_DIRECTION: PROGS
OD_AXIS: 095
OS_OVR_VA: 20/
OD_ADD: +2.50
OD_CYLINDER: -1.75
OD_VPRISM_DIRECTION: PROGS
OS_CYLINDER: -1.00
OS_SPHERE: +0.50
OD_SPHERE: +1.50
OD_OVR_VA: 20/

## 2020-06-24 ASSESSMENT — REFRACTION_MANIFEST
OS_VA1: 20/25-2
OD_AXIS: 095
OS_SPHERE: +0.50
OD_CYLINDER: -1.75
OD_ADD: +2.50
OS_AXIS: 085
OD_VA1: 20/30-2
OD_VA2: 20/30
OS_CYLINDER: -1.00
OS_ADD: +2.50
OD_SPHERE: +1.50
OS_VA2: 20/25

## 2020-06-24 ASSESSMENT — VISUAL ACUITY
OD_BCVA: 20/25-1
OS_BCVA: 20/30-1

## 2020-06-24 ASSESSMENT — REFRACTION_AUTOREFRACTION
OD_AXIS: 076
OD_CYLINDER: -4.00
OS_AXIS: 088
OS_SPHERE: +1.75
OS_CYLINDER: -2.25
OD_SPHERE: +2.50

## 2020-06-24 ASSESSMENT — LID POSITION - DERMATOCHALASIS
OD_DERMATOCHALASIS: RUL 1+
OS_DERMATOCHALASIS: LUL 1+

## 2020-06-24 ASSESSMENT — KERATOMETRY
OS_AXISANGLE_DEGREES: 089
OS_K2POWER_DIOPTERS: 43.75
OS_K1POWER_DIOPTERS: 42.25
OD_K1POWER_DIOPTERS: 43.00
OD_K2POWER_DIOPTERS: 43.50
OD_AXISANGLE_DEGREES: 063

## 2020-06-24 ASSESSMENT — PACHYMETRY
OS_CT_CORRECTION: 1
OS_CT_UM: 527
OD_CT_UM: 531
OD_CT_CORRECTION: 1

## 2020-06-24 ASSESSMENT — CONFRONTATIONAL VISUAL FIELD TEST (CVF)
OD_FINDINGS: FULL
OS_FINDINGS: FULL

## 2020-06-24 ASSESSMENT — AXIALLENGTH_DERIVED
OS_AL: 23.5322
OS_AL: 23.777
OD_AL: 23.4894
OD_AL: 23.4412

## 2020-06-24 ASSESSMENT — CORNEAL DYSTROPHY - BAND KERATOPATHY
OS_BANDKERATOPATHY: 1+
OD_BANDKERATOPATHY: 1+

## 2020-06-24 ASSESSMENT — CORNEAL DYSTROPHY
OD_DYSTROPHY: ARCUS SENILIS
OS_DYSTROPHY: ARCUS SENILIS

## 2020-06-24 ASSESSMENT — SUPERFICIAL PUNCTATE KERATITIS (SPK): OS_SPK: 2+

## 2020-06-24 ASSESSMENT — LID EXAM ASSESSMENTS
OS_BLEPHARITIS: LLL LUL
OD_BLEPHARITIS: RLL RUL

## 2020-06-24 ASSESSMENT — SPHEQUIV_DERIVED
OD_SPHEQUIV: 0.625
OS_SPHEQUIV: 0
OD_SPHEQUIV: 0.5
OS_SPHEQUIV: 0.625

## 2020-06-24 ASSESSMENT — DRY EYES - PHYSICIAN NOTES
OS_GENERALCOMMENTS: LOW TEAR FILM
OD_GENERALCOMMENTS: LOW TEAR FILM

## 2021-01-06 ENCOUNTER — DOCTOR'S OFFICE (OUTPATIENT)
Dept: URBAN - NONMETROPOLITAN AREA CLINIC 1 | Facility: CLINIC | Age: 81
Setting detail: OPHTHALMOLOGY
End: 2021-01-06
Payer: COMMERCIAL

## 2021-01-06 DIAGNOSIS — H35.3131: ICD-10-CM

## 2021-01-06 DIAGNOSIS — H04.121: ICD-10-CM

## 2021-01-06 DIAGNOSIS — H04.122: ICD-10-CM

## 2021-01-06 DIAGNOSIS — H01.004: ICD-10-CM

## 2021-01-06 DIAGNOSIS — H27.8: ICD-10-CM

## 2021-01-06 DIAGNOSIS — H40.013: ICD-10-CM

## 2021-01-06 DIAGNOSIS — H01.001: ICD-10-CM

## 2021-01-06 DIAGNOSIS — H43.813: ICD-10-CM

## 2021-01-06 PROCEDURE — 92083 EXTENDED VISUAL FIELD XM: CPT | Performed by: OPHTHALMOLOGY

## 2021-01-06 PROCEDURE — 92134 CPTRZ OPH DX IMG PST SGM RTA: CPT | Performed by: OPHTHALMOLOGY

## 2021-01-06 PROCEDURE — 92014 COMPRE OPH EXAM EST PT 1/>: CPT | Performed by: OPHTHALMOLOGY

## 2021-01-06 ASSESSMENT — LID EXAM ASSESSMENTS
OD_BLEPHARITIS: RLL RUL
OS_BLEPHARITIS: LLL LUL

## 2021-01-06 ASSESSMENT — CORNEAL DYSTROPHY
OD_DYSTROPHY: ARCUS SENILIS
OS_DYSTROPHY: ARCUS SENILIS

## 2021-01-06 ASSESSMENT — REFRACTION_MANIFEST
OS_CYLINDER: -1.00
OS_ADD: +2.50
OS_SPHERE: +0.50
OD_ADD: +2.50
OD_VA2: 20/30
OS_VA2: 20/25
OD_CYLINDER: -1.75
OD_VA1: 20/30-2
OD_SPHERE: +1.50
OS_VA1: 20/25-2
OD_AXIS: 095
OS_AXIS: 085

## 2021-01-06 ASSESSMENT — REFRACTION_AUTOREFRACTION
OS_CYLINDER: -3.00
OS_AXIS: 081
OS_SPHERE: +2.75
OD_AXIS: 097
OD_CYLINDER: -1.50
OD_SPHERE: +2.00

## 2021-01-06 ASSESSMENT — AXIALLENGTH_DERIVED
OS_AL: 23.777
OS_AL: 23.2924
OD_AL: 23.4412
OD_AL: 23.2032

## 2021-01-06 ASSESSMENT — KERATOMETRY
OD_K1POWER_DIOPTERS: 43.00
OS_K2POWER_DIOPTERS: 43.75
OD_AXISANGLE_DEGREES: 063
OD_K2POWER_DIOPTERS: 43.50
OS_AXISANGLE_DEGREES: 089
OS_K1POWER_DIOPTERS: 42.25

## 2021-01-06 ASSESSMENT — PACHYMETRY
OS_CT_UM: 527
OD_CT_CORRECTION: 1
OS_CT_CORRECTION: 1
OD_CT_UM: 531

## 2021-01-06 ASSESSMENT — CORNEAL DYSTROPHY - BAND KERATOPATHY
OD_BANDKERATOPATHY: 1+
OS_BANDKERATOPATHY: 1+

## 2021-01-06 ASSESSMENT — CONFRONTATIONAL VISUAL FIELD TEST (CVF)
OD_FINDINGS: FULL
OS_FINDINGS: FULL

## 2021-01-06 ASSESSMENT — SPHEQUIV_DERIVED
OD_SPHEQUIV: 0.625
OS_SPHEQUIV: 1.25
OS_SPHEQUIV: 0
OD_SPHEQUIV: 1.25

## 2021-01-06 ASSESSMENT — REFRACTION_CURRENTRX
OS_OVR_VA: 20/
OD_AXIS: 095
OS_ADD: +2.50
OD_SPHERE: +1.50
OD_VPRISM_DIRECTION: PROGS
OD_CYLINDER: -1.75
OS_AXIS: 085
OS_CYLINDER: -1.00
OS_VPRISM_DIRECTION: PROGS
OS_SPHERE: +0.50
OD_ADD: +2.50
OD_OVR_VA: 20/

## 2021-01-06 ASSESSMENT — LID POSITION - DERMATOCHALASIS
OD_DERMATOCHALASIS: RUL 1+
OS_DERMATOCHALASIS: LUL 1+

## 2021-01-06 ASSESSMENT — VISUAL ACUITY
OD_BCVA: 20/60+1
OS_BCVA: 20/50-2

## 2021-01-06 ASSESSMENT — TONOMETRY
OS_IOP_MMHG: 14
OD_IOP_MMHG: 13

## 2021-01-06 ASSESSMENT — DRY EYES - PHYSICIAN NOTES
OS_GENERALCOMMENTS: LOW TEAR FILM
OD_GENERALCOMMENTS: LOW TEAR FILM

## 2021-01-06 ASSESSMENT — SUPERFICIAL PUNCTATE KERATITIS (SPK): OS_SPK: 2+

## 2021-01-12 ENCOUNTER — DOCTOR'S OFFICE (OUTPATIENT)
Dept: URBAN - NONMETROPOLITAN AREA CLINIC 1 | Facility: CLINIC | Age: 81
Setting detail: OPHTHALMOLOGY
End: 2021-01-12

## 2021-01-12 ENCOUNTER — OPTICAL OFFICE (OUTPATIENT)
Dept: URBAN - NONMETROPOLITAN AREA CLINIC 4 | Facility: CLINIC | Age: 81
Setting detail: OPHTHALMOLOGY
End: 2021-01-12

## 2021-01-12 VITALS — HEIGHT: 55 IN

## 2021-01-12 DIAGNOSIS — H52.223: ICD-10-CM

## 2021-01-12 DIAGNOSIS — H52.4: ICD-10-CM

## 2021-01-12 PROCEDURE — 92015 DETERMINE REFRACTIVE STATE: CPT | Performed by: OPTOMETRIST

## 2021-01-12 PROCEDURE — V2020 VISION SVCS FRAMES PURCHASES: HCPCS | Performed by: OPTOMETRIST

## 2021-01-12 PROCEDURE — V2781 PROGRESSIVE LENS PER LENS: HCPCS | Performed by: OPTOMETRIST

## 2021-01-12 ASSESSMENT — VISUAL ACUITY
OS_BCVA: 20/50+2
OD_BCVA: 20/25-1

## 2021-01-12 ASSESSMENT — KERATOMETRY
OD_K1POWER_DIOPTERS: 43.00
OS_K2POWER_DIOPTERS: 43.75
OS_AXISANGLE_DEGREES: 089
OD_K2POWER_DIOPTERS: 43.50
OS_K1POWER_DIOPTERS: 42.25
OD_AXISANGLE_DEGREES: 063

## 2021-01-12 ASSESSMENT — REFRACTION_MANIFEST
OD_AXIS: 095
OS_SPHERE: +0.50
OD_CYLINDER: -2.00
OS_AXIS: 085
OS_VA2: 20/25
OD_VA1: 20/40+1
OS_ADD: +2.50
OD_ADD: +2.50
OD_VA2: 20/40+1
OS_CYLINDER: -1.00
OS_VA1: 20/25-2
OD_SPHERE: +1.75

## 2021-01-12 ASSESSMENT — REFRACTION_CURRENTRX
OD_ADD: +0.00
OD_CYLINDER: -1.75
OS_SPHERE: +0.50
OS_VPRISM_DIRECTION: PROGS
OS_AXIS: 091
OS_CYLINDER: -1.00
OS_ADD: +0.00
OD_VPRISM_DIRECTION: PROGS
OS_OVR_VA: 20/
OD_SPHERE: +1.50
OD_AXIS: 081
OD_OVR_VA: 20/

## 2021-01-12 ASSESSMENT — SPHEQUIV_DERIVED
OS_SPHEQUIV: 0
OD_SPHEQUIV: 0.75

## 2021-01-12 ASSESSMENT — AXIALLENGTH_DERIVED
OD_AL: 23.3932
OS_AL: 23.777

## 2021-07-14 ENCOUNTER — DOCTOR'S OFFICE (OUTPATIENT)
Dept: URBAN - NONMETROPOLITAN AREA CLINIC 1 | Facility: CLINIC | Age: 81
Setting detail: OPHTHALMOLOGY
End: 2021-07-14
Payer: COMMERCIAL

## 2021-07-14 DIAGNOSIS — H35.3131: ICD-10-CM

## 2021-07-14 DIAGNOSIS — H04.122: ICD-10-CM

## 2021-07-14 DIAGNOSIS — H40.013: ICD-10-CM

## 2021-07-14 DIAGNOSIS — H27.8: ICD-10-CM

## 2021-07-14 DIAGNOSIS — H04.121: ICD-10-CM

## 2021-07-14 PROBLEM — H01.001 BLEPHARITIS; RIGHT UPPER LID, LEFT UPPER LID: Status: RESOLVED | Noted: 2021-01-06 | Resolved: 2021-07-14

## 2021-07-14 PROBLEM — H01.004 BLEPHARITIS; RIGHT UPPER LID, LEFT UPPER LID: Status: RESOLVED | Noted: 2021-01-06 | Resolved: 2021-07-14

## 2021-07-14 PROCEDURE — 92133 CPTRZD OPH DX IMG PST SGM ON: CPT | Performed by: OPHTHALMOLOGY

## 2021-07-14 PROCEDURE — 92014 COMPRE OPH EXAM EST PT 1/>: CPT | Performed by: OPHTHALMOLOGY

## 2021-07-14 PROCEDURE — 76514 ECHO EXAM OF EYE THICKNESS: CPT | Performed by: OPHTHALMOLOGY

## 2021-07-14 ASSESSMENT — REFRACTION_CURRENTRX
OS_OVR_VA: 20/
OD_VPRISM_DIRECTION: PROGS
OS_VPRISM_DIRECTION: PROGS
OS_AXIS: 091
OD_OVR_VA: 20/
OS_CYLINDER: -1.00
OD_AXIS: 081
OD_CYLINDER: -1.75
OD_SPHERE: +1.50
OS_ADD: +0.00
OD_ADD: +0.00
OS_SPHERE: +0.50

## 2021-07-14 ASSESSMENT — REFRACTION_MANIFEST
OD_VA1: 20/40+1
OS_ADD: +2.50
OS_VA2: 20/25
OS_SPHERE: +0.50
OD_VA2: 20/40+1
OD_SPHERE: +1.75
OS_CYLINDER: -1.00
OS_AXIS: 085
OS_VA1: 20/25-2
OD_ADD: +2.50
OD_CYLINDER: -2.00
OD_AXIS: 095

## 2021-07-14 ASSESSMENT — PACHYMETRY
OD_CT_CORRECTION: 1
OS_CT_CORRECTION: 1
OD_CT_UM: 531
OS_CT_UM: 527

## 2021-07-14 ASSESSMENT — KERATOMETRY
OS_K2POWER_DIOPTERS: 43.75
OD_AXISANGLE_DEGREES: 063
OS_K1POWER_DIOPTERS: 42.25
OD_K1POWER_DIOPTERS: 43.00
OS_AXISANGLE_DEGREES: 089
OD_K2POWER_DIOPTERS: 43.50

## 2021-07-14 ASSESSMENT — SUPERFICIAL PUNCTATE KERATITIS (SPK): OS_SPK: 2+

## 2021-07-14 ASSESSMENT — CONFRONTATIONAL VISUAL FIELD TEST (CVF)
OS_FINDINGS: FULL
OD_FINDINGS: FULL

## 2021-07-14 ASSESSMENT — TONOMETRY
OD_IOP_MMHG: 16
OS_IOP_MMHG: 17

## 2021-07-14 ASSESSMENT — REFRACTION_AUTOREFRACTION
OS_SPHERE: ERROR
OD_SPHERE: ERROR

## 2021-07-14 ASSESSMENT — AXIALLENGTH_DERIVED
OD_AL: 23.3932
OS_AL: 23.777

## 2021-07-14 ASSESSMENT — LID EXAM ASSESSMENTS
OS_BLEPHARITIS: LLL LUL
OD_BLEPHARITIS: RLL RUL

## 2021-07-14 ASSESSMENT — SPHEQUIV_DERIVED
OD_SPHEQUIV: 0.75
OS_SPHEQUIV: 0

## 2021-07-14 ASSESSMENT — CORNEAL DYSTROPHY
OS_DYSTROPHY: ARCUS SENILIS
OD_DYSTROPHY: ARCUS SENILIS

## 2021-07-14 ASSESSMENT — VISUAL ACUITY
OD_BCVA: 20/25
OS_BCVA: 20/50-2

## 2021-07-14 ASSESSMENT — LID POSITION - DERMATOCHALASIS
OD_DERMATOCHALASIS: RUL 1+
OS_DERMATOCHALASIS: LUL 1+

## 2021-07-14 ASSESSMENT — CORNEAL DYSTROPHY - BAND KERATOPATHY
OD_BANDKERATOPATHY: 1+
OS_BANDKERATOPATHY: 1+

## 2021-07-14 ASSESSMENT — DRY EYES - PHYSICIAN NOTES
OD_GENERALCOMMENTS: LOW TEAR FILM
OS_GENERALCOMMENTS: LOW TEAR FILM

## 2022-01-14 ENCOUNTER — DOCTOR'S OFFICE (OUTPATIENT)
Dept: URBAN - NONMETROPOLITAN AREA CLINIC 1 | Facility: CLINIC | Age: 82
Setting detail: OPHTHALMOLOGY
End: 2022-01-14
Payer: COMMERCIAL

## 2022-01-14 DIAGNOSIS — H27.8: ICD-10-CM

## 2022-01-14 DIAGNOSIS — H04.122: ICD-10-CM

## 2022-01-14 DIAGNOSIS — H40.013: ICD-10-CM

## 2022-01-14 DIAGNOSIS — H04.121: ICD-10-CM

## 2022-01-14 DIAGNOSIS — H35.3131: ICD-10-CM

## 2022-01-14 PROCEDURE — 83861 MICROFLUID ANALY TEARS: CPT | Performed by: OPHTHALMOLOGY

## 2022-01-14 PROCEDURE — 92014 COMPRE OPH EXAM EST PT 1/>: CPT | Performed by: OPHTHALMOLOGY

## 2022-01-14 PROCEDURE — 92083 EXTENDED VISUAL FIELD XM: CPT | Performed by: OPHTHALMOLOGY

## 2022-01-14 PROCEDURE — 92134 CPTRZ OPH DX IMG PST SGM RTA: CPT | Performed by: OPHTHALMOLOGY

## 2022-01-14 ASSESSMENT — AXIALLENGTH_DERIVED
OS_AL: 23.777
OD_AL: 23.3932
OS_AL: 23.1978
OD_AL: 23.1094

## 2022-01-14 ASSESSMENT — REFRACTION_CURRENTRX
OD_SPHERE: +1.75
OD_CYLINDER: -2.00
OS_AXIS: 085
OD_OVR_VA: 20/
OS_CYLINDER: -1.00
OS_VPRISM_DIRECTION: PROGS
OS_SPHERE: +0.50
OS_OVR_VA: 20/
OD_ADD: +2.50
OD_VPRISM_DIRECTION: PROGS
OS_ADD: +2.50
OD_AXIS: 095

## 2022-01-14 ASSESSMENT — KERATOMETRY
OS_AXISANGLE_DEGREES: 089
OD_K1POWER_DIOPTERS: 43.00
OS_K1POWER_DIOPTERS: 42.25
OD_AXISANGLE_DEGREES: 063
OS_K2POWER_DIOPTERS: 43.75
OD_K2POWER_DIOPTERS: 43.50

## 2022-01-14 ASSESSMENT — REFRACTION_AUTOREFRACTION
OS_AXIS: 092
OD_AXIS: 103
OS_CYLINDER: -4.50
OD_SPHERE: +3.00
OD_CYLINDER: -3.00
OS_SPHERE: +3.75

## 2022-01-14 ASSESSMENT — SPHEQUIV_DERIVED
OS_SPHEQUIV: 0
OS_SPHEQUIV: 1.5
OD_SPHEQUIV: 0.75
OD_SPHEQUIV: 1.5

## 2022-01-14 ASSESSMENT — REFRACTION_MANIFEST
OS_VA2: 20/25
OS_CYLINDER: -1.00
OD_SPHERE: +1.75
OS_ADD: +2.50
OS_VA1: 20/25-2
OS_AXIS: 085
OD_VA1: 20/40+1
OD_ADD: +2.50
OD_AXIS: 095
OD_CYLINDER: -2.00
OS_SPHERE: +0.50
OD_VA2: 20/40+1

## 2022-01-14 ASSESSMENT — CONFRONTATIONAL VISUAL FIELD TEST (CVF)
OD_FINDINGS: FULL
OS_FINDINGS: FULL

## 2022-01-14 ASSESSMENT — CORNEAL DYSTROPHY
OS_DYSTROPHY: ARCUS SENILIS
OD_DYSTROPHY: ARCUS SENILIS

## 2022-01-14 ASSESSMENT — LID POSITION - DERMATOCHALASIS
OD_DERMATOCHALASIS: RUL 1+
OS_DERMATOCHALASIS: LUL 1+

## 2022-01-14 ASSESSMENT — CORNEAL DYSTROPHY - BAND KERATOPATHY
OS_BANDKERATOPATHY: 1+
OD_BANDKERATOPATHY: 1+

## 2022-01-14 ASSESSMENT — PACHYMETRY
OS_CT_UM: 527
OD_CT_UM: 531
OS_CT_CORRECTION: 1
OD_CT_CORRECTION: 1

## 2022-01-14 ASSESSMENT — DRY EYES - PHYSICIAN NOTES
OS_GENERALCOMMENTS: LOW TEAR FILM
OD_GENERALCOMMENTS: LOW TEAR FILM

## 2022-01-14 ASSESSMENT — LID EXAM ASSESSMENTS
OS_BLEPHARITIS: LLL LUL
OD_BLEPHARITIS: RLL RUL

## 2022-01-14 ASSESSMENT — TONOMETRY
OS_IOP_MMHG: 16
OD_IOP_MMHG: 15

## 2022-01-14 ASSESSMENT — SUPERFICIAL PUNCTATE KERATITIS (SPK): OS_SPK: 2+

## 2022-01-14 ASSESSMENT — VISUAL ACUITY
OD_BCVA: 20/25-2
OS_BCVA: 20/50-2

## 2022-07-19 ENCOUNTER — DOCTOR'S OFFICE (OUTPATIENT)
Dept: URBAN - NONMETROPOLITAN AREA CLINIC 1 | Facility: CLINIC | Age: 82
Setting detail: OPHTHALMOLOGY
End: 2022-07-19
Payer: COMMERCIAL

## 2022-07-19 DIAGNOSIS — H35.3131: ICD-10-CM

## 2022-07-19 DIAGNOSIS — H40.013: ICD-10-CM

## 2022-07-19 DIAGNOSIS — H04.122: ICD-10-CM

## 2022-07-19 DIAGNOSIS — H27.8: ICD-10-CM

## 2022-07-19 DIAGNOSIS — H04.121: ICD-10-CM

## 2022-07-19 PROBLEM — H02.831 DERMATOCHALASIS; RIGHT UPPER LID, LEFT UPPER LID: Status: ACTIVE | Noted: 2018-12-19

## 2022-07-19 PROBLEM — H02.834 DERMATOCHALASIS; RIGHT UPPER LID, LEFT UPPER LID: Status: ACTIVE | Noted: 2018-12-19

## 2022-07-19 PROBLEM — H04.123 DRY EYES; RIGHT EYE, LEFT EYE, BOTH EYES: Status: ACTIVE | Noted: 2017-09-06

## 2022-07-19 PROBLEM — H43.813 POSTERIOR VITREOUS DETACHMENT; BOTH EYES: Status: ACTIVE | Noted: 2018-09-19

## 2022-07-19 PROCEDURE — 92012 INTRM OPH EXAM EST PATIENT: CPT | Performed by: OPHTHALMOLOGY

## 2022-07-19 PROCEDURE — 92133 CPTRZD OPH DX IMG PST SGM ON: CPT | Performed by: OPHTHALMOLOGY

## 2022-07-19 ASSESSMENT — REFRACTION_MANIFEST
OS_SPHERE: +0.50
OS_CYLINDER: -1.00
OD_VA2: 20/40+1
OD_AXIS: 095
OD_CYLINDER: -2.00
OS_VA1: 20/25-2
OS_AXIS: 085
OD_VA1: 20/40+1
OD_SPHERE: +1.75
OD_ADD: +2.50
OS_VA2: 20/25
OS_ADD: +2.50

## 2022-07-19 ASSESSMENT — SUPERFICIAL PUNCTATE KERATITIS (SPK): OS_SPK: 2+

## 2022-07-19 ASSESSMENT — LID EXAM ASSESSMENTS
OD_BLEPHARITIS: RLL RUL
OS_BLEPHARITIS: LLL LUL

## 2022-07-19 ASSESSMENT — KERATOMETRY
OS_K1POWER_DIOPTERS: 42.25
OD_AXISANGLE_DEGREES: 063
OS_AXISANGLE_DEGREES: 089
OS_K2POWER_DIOPTERS: 43.75
OD_K1POWER_DIOPTERS: 43.00
OD_K2POWER_DIOPTERS: 43.50

## 2022-07-19 ASSESSMENT — REFRACTION_CURRENTRX
OD_SPHERE: +1.75
OS_SPHERE: +0.50
OS_ADD: +2.50
OD_CYLINDER: -2.00
OS_CYLINDER: -1.00
OD_AXIS: 095
OS_AXIS: 085
OD_OVR_VA: 20/
OD_VPRISM_DIRECTION: PROGS
OD_ADD: +2.50
OS_VPRISM_DIRECTION: PROGS
OS_OVR_VA: 20/

## 2022-07-19 ASSESSMENT — DRY EYES - PHYSICIAN NOTES
OS_GENERALCOMMENTS: LOW TEAR FILM
OD_GENERALCOMMENTS: LOW TEAR FILM

## 2022-07-19 ASSESSMENT — VISUAL ACUITY
OD_BCVA: 20/25-1
OS_BCVA: 20/40-2

## 2022-07-19 ASSESSMENT — AXIALLENGTH_DERIVED
OS_AL: 23.1978
OS_AL: 23.777
OD_AL: 23.1094
OD_AL: 23.3932

## 2022-07-19 ASSESSMENT — REFRACTION_AUTOREFRACTION
OS_SPHERE: +3.75
OS_AXIS: 092
OD_CYLINDER: -3.00
OS_CYLINDER: -4.50
OD_AXIS: 103
OD_SPHERE: +3.00

## 2022-07-19 ASSESSMENT — SPHEQUIV_DERIVED
OS_SPHEQUIV: 0
OS_SPHEQUIV: 1.5
OD_SPHEQUIV: 0.75
OD_SPHEQUIV: 1.5

## 2022-07-19 ASSESSMENT — CORNEAL DYSTROPHY
OD_DYSTROPHY: ARCUS SENILIS
OS_DYSTROPHY: ARCUS SENILIS

## 2022-07-19 ASSESSMENT — CONFRONTATIONAL VISUAL FIELD TEST (CVF)
OD_FINDINGS: FULL
OS_FINDINGS: FULL

## 2022-07-19 ASSESSMENT — TONOMETRY
OS_IOP_MMHG: 14
OD_IOP_MMHG: 13

## 2022-07-19 ASSESSMENT — PACHYMETRY
OS_CT_CORRECTION: 1
OS_CT_UM: 527
OD_CT_UM: 531
OD_CT_CORRECTION: 1

## 2022-07-19 ASSESSMENT — CORNEAL DYSTROPHY - BAND KERATOPATHY
OS_BANDKERATOPATHY: 1+
OD_BANDKERATOPATHY: 1+

## 2022-07-19 ASSESSMENT — LID POSITION - DERMATOCHALASIS
OD_DERMATOCHALASIS: RUL 1+
OS_DERMATOCHALASIS: LUL 1+

## 2023-01-24 ENCOUNTER — DOCTOR'S OFFICE (OUTPATIENT)
Dept: URBAN - NONMETROPOLITAN AREA CLINIC 1 | Facility: CLINIC | Age: 83
Setting detail: OPHTHALMOLOGY
End: 2023-01-24
Payer: COMMERCIAL

## 2023-01-24 DIAGNOSIS — H02.834: ICD-10-CM

## 2023-01-24 DIAGNOSIS — H04.122: ICD-10-CM

## 2023-01-24 DIAGNOSIS — H04.121: ICD-10-CM

## 2023-01-24 DIAGNOSIS — H40.013: ICD-10-CM

## 2023-01-24 DIAGNOSIS — H02.831: ICD-10-CM

## 2023-01-24 DIAGNOSIS — H35.3131: ICD-10-CM

## 2023-01-24 PROCEDURE — 92083 EXTENDED VISUAL FIELD XM: CPT | Performed by: OPHTHALMOLOGY

## 2023-01-24 PROCEDURE — 99214 OFFICE O/P EST MOD 30 MIN: CPT | Performed by: OPHTHALMOLOGY

## 2023-01-24 PROCEDURE — 92134 CPTRZ OPH DX IMG PST SGM RTA: CPT | Performed by: OPHTHALMOLOGY

## 2023-01-24 PROCEDURE — 83861 MICROFLUID ANALY TEARS: CPT | Performed by: OPHTHALMOLOGY

## 2023-01-24 PROCEDURE — 76514 ECHO EXAM OF EYE THICKNESS: CPT | Performed by: OPHTHALMOLOGY

## 2023-01-24 ASSESSMENT — REFRACTION_CURRENTRX
OD_SPHERE: +1.75
OD_ADD: +2.50
OS_ADD: +2.50
OD_VPRISM_DIRECTION: PROGS
OS_SPHERE: +0.50
OS_OVR_VA: 20/
OD_CYLINDER: -2.00
OD_AXIS: 095
OS_CYLINDER: -1.00
OS_VPRISM_DIRECTION: PROGS
OD_OVR_VA: 20/
OS_AXIS: 085

## 2023-01-24 ASSESSMENT — REFRACTION_MANIFEST
OD_VA2: 20/40+1
OS_ADD: +2.50
OS_SPHERE: +0.50
OS_VA2: 20/25
OD_CYLINDER: -2.00
OS_CYLINDER: -1.00
OD_VA1: 20/40+1
OD_SPHERE: +1.75
OD_AXIS: 095
OS_AXIS: 085
OD_ADD: +2.50
OS_VA1: 20/25-2

## 2023-01-24 ASSESSMENT — SPHEQUIV_DERIVED
OS_SPHEQUIV: 0.5
OD_SPHEQUIV: 0.75
OS_SPHEQUIV: 0
OD_SPHEQUIV: 2.25

## 2023-01-24 ASSESSMENT — REFRACTION_AUTOREFRACTION
OD_AXIS: 107
OS_AXIS: 80
OS_SPHERE: +2.00
OD_CYLINDER: -1.50
OS_CYLINDER: -3.00
OD_SPHERE: +3.00

## 2023-01-24 ASSESSMENT — CONFRONTATIONAL VISUAL FIELD TEST (CVF)
OS_FINDINGS: FULL
OD_FINDINGS: FULL

## 2023-01-24 ASSESSMENT — CORNEAL DYSTROPHY
OD_DYSTROPHY: ARCUS SENILIS
OS_DYSTROPHY: ARCUS SENILIS

## 2023-01-24 ASSESSMENT — AXIALLENGTH_DERIVED
OS_AL: 23.5808
OS_AL: 23.777
OD_AL: 22.8324
OD_AL: 23.3932

## 2023-01-24 ASSESSMENT — PACHYMETRY
OD_CT_CORRECTION: 1
OS_CT_CORRECTION: 1
OS_CT_UM: 527
OD_CT_UM: 531

## 2023-01-24 ASSESSMENT — TONOMETRY
OS_IOP_MMHG: 14
OD_IOP_MMHG: 14

## 2023-01-24 ASSESSMENT — KERATOMETRY
OD_K1POWER_DIOPTERS: 43.00
OS_AXISANGLE_DEGREES: 089
OS_K1POWER_DIOPTERS: 42.25
OD_K2POWER_DIOPTERS: 43.50
OS_K2POWER_DIOPTERS: 43.75
OD_AXISANGLE_DEGREES: 063

## 2023-01-24 ASSESSMENT — CORNEAL DYSTROPHY - BAND KERATOPATHY
OD_BANDKERATOPATHY: 1+
OS_BANDKERATOPATHY: 1+

## 2023-01-24 ASSESSMENT — DRY EYES - PHYSICIAN NOTES
OD_GENERALCOMMENTS: LOW TEAR FILM
OS_GENERALCOMMENTS: LOW TEAR FILM

## 2023-01-24 ASSESSMENT — LID EXAM ASSESSMENTS
OD_BLEPHARITIS: RLL RUL
OS_BLEPHARITIS: LLL LUL

## 2023-01-24 ASSESSMENT — VISUAL ACUITY
OD_BCVA: 20/25-1
OS_BCVA: 20/40-2

## 2023-01-24 ASSESSMENT — LID POSITION - DERMATOCHALASIS
OD_DERMATOCHALASIS: RUL 1+
OS_DERMATOCHALASIS: LUL 1+

## 2023-01-24 ASSESSMENT — SUPERFICIAL PUNCTATE KERATITIS (SPK): OS_SPK: 2+

## 2023-08-01 ENCOUNTER — DOCTOR'S OFFICE (OUTPATIENT)
Dept: URBAN - NONMETROPOLITAN AREA CLINIC 1 | Facility: CLINIC | Age: 83
Setting detail: OPHTHALMOLOGY
End: 2023-08-01
Payer: COMMERCIAL

## 2023-08-01 DIAGNOSIS — H43.813: ICD-10-CM

## 2023-08-01 DIAGNOSIS — H40.013: ICD-10-CM

## 2023-08-01 DIAGNOSIS — H04.121: ICD-10-CM

## 2023-08-01 DIAGNOSIS — H40.052: ICD-10-CM

## 2023-08-01 DIAGNOSIS — H04.122: ICD-10-CM

## 2023-08-01 DIAGNOSIS — H35.3131: ICD-10-CM

## 2023-08-01 PROCEDURE — 99213 OFFICE O/P EST LOW 20 MIN: CPT | Performed by: OPHTHALMOLOGY

## 2023-08-01 PROCEDURE — 92133 CPTRZD OPH DX IMG PST SGM ON: CPT | Performed by: OPHTHALMOLOGY

## 2023-08-01 ASSESSMENT — SPHEQUIV_DERIVED
OD_SPHEQUIV: 0.75
OS_SPHEQUIV: 0.5
OD_SPHEQUIV: 2.25
OS_SPHEQUIV: 0

## 2023-08-01 ASSESSMENT — CORNEAL DYSTROPHY
OS_DYSTROPHY: ARCUS SENILIS
OD_DYSTROPHY: ARCUS SENILIS

## 2023-08-01 ASSESSMENT — REFRACTION_CURRENTRX
OS_SPHERE: +0.50
OD_CYLINDER: -2.00
OD_SPHERE: +1.75
OD_AXIS: 095
OS_AXIS: 085
OD_OVR_VA: 20/
OS_VPRISM_DIRECTION: PROGS
OS_ADD: +2.50
OS_OVR_VA: 20/
OD_ADD: +2.50
OD_VPRISM_DIRECTION: PROGS
OS_CYLINDER: -1.00

## 2023-08-01 ASSESSMENT — KERATOMETRY
OS_K2POWER_DIOPTERS: 43.75
OD_K2POWER_DIOPTERS: 43.50
OS_K1POWER_DIOPTERS: 42.25
OD_AXISANGLE_DEGREES: 063
OD_K1POWER_DIOPTERS: 43.00
OS_AXISANGLE_DEGREES: 089

## 2023-08-01 ASSESSMENT — AXIALLENGTH_DERIVED
OS_AL: 23.5808
OS_AL: 23.777
OD_AL: 23.3932
OD_AL: 22.8324

## 2023-08-01 ASSESSMENT — REFRACTION_AUTOREFRACTION
OD_SPHERE: +3.00
OS_CYLINDER: -3.00
OD_AXIS: 107
OS_AXIS: 80
OD_CYLINDER: -1.50
OS_SPHERE: +2.00

## 2023-08-01 ASSESSMENT — REFRACTION_MANIFEST
OS_VA2: 20/25
OS_VA1: 20/25-2
OS_AXIS: 085
OD_VA2: 20/40+1
OS_SPHERE: +0.50
OD_ADD: +2.50
OD_SPHERE: +1.75
OD_AXIS: 095
OS_ADD: +2.50
OD_CYLINDER: -2.00
OS_CYLINDER: -1.00
OD_VA1: 20/40+1

## 2023-08-01 ASSESSMENT — DRY EYES - PHYSICIAN NOTES
OS_GENERALCOMMENTS: LOW TEAR FILM
OD_GENERALCOMMENTS: LOW TEAR FILM

## 2023-08-01 ASSESSMENT — CONFRONTATIONAL VISUAL FIELD TEST (CVF)
OD_FINDINGS: FULL
OS_FINDINGS: FULL

## 2023-08-01 ASSESSMENT — PACHYMETRY
OS_CT_UM: 527
OD_CT_UM: 531
OD_CT_CORRECTION: 1
OS_CT_CORRECTION: 1

## 2023-08-01 ASSESSMENT — VISUAL ACUITY
OD_BCVA: 20/25
OS_BCVA: 20/30-2

## 2023-08-01 ASSESSMENT — LID POSITION - DERMATOCHALASIS
OD_DERMATOCHALASIS: RUL 1+
OS_DERMATOCHALASIS: LUL 1+

## 2023-08-01 ASSESSMENT — CORNEAL DYSTROPHY - BAND KERATOPATHY
OD_BANDKERATOPATHY: 1+
OS_BANDKERATOPATHY: 1+

## 2023-08-01 ASSESSMENT — SUPERFICIAL PUNCTATE KERATITIS (SPK): OS_SPK: 2+

## 2023-08-01 ASSESSMENT — TONOMETRY: OD_IOP_MMHG: 14

## 2023-08-01 ASSESSMENT — LID EXAM ASSESSMENTS
OD_BLEPHARITIS: RLL RUL
OS_BLEPHARITIS: LLL LUL

## 2023-09-19 ENCOUNTER — DOCTOR'S OFFICE (OUTPATIENT)
Dept: URBAN - NONMETROPOLITAN AREA CLINIC 1 | Facility: CLINIC | Age: 83
Setting detail: OPHTHALMOLOGY
End: 2023-09-19
Payer: COMMERCIAL

## 2023-09-19 DIAGNOSIS — H04.122: ICD-10-CM

## 2023-09-19 DIAGNOSIS — H40.052: ICD-10-CM

## 2023-09-19 DIAGNOSIS — H04.121: ICD-10-CM

## 2023-09-19 DIAGNOSIS — H40.013: ICD-10-CM

## 2023-09-19 DIAGNOSIS — H35.3131: ICD-10-CM

## 2023-09-19 DIAGNOSIS — H43.813: ICD-10-CM

## 2023-09-19 PROCEDURE — 99213 OFFICE O/P EST LOW 20 MIN: CPT | Performed by: OPHTHALMOLOGY

## 2023-09-19 PROCEDURE — 92133 CPTRZD OPH DX IMG PST SGM ON: CPT | Performed by: OPHTHALMOLOGY

## 2023-09-19 ASSESSMENT — REFRACTION_MANIFEST
OD_ADD: +2.50
OD_SPHERE: +1.75
OS_CYLINDER: -1.00
OS_ADD: +2.50
OS_VA1: 20/25-2
OS_VA2: 20/25
OD_VA2: 20/40+1
OD_CYLINDER: -2.00
OS_AXIS: 085
OD_AXIS: 095
OS_SPHERE: +0.50
OD_VA1: 20/40+1

## 2023-09-19 ASSESSMENT — CORNEAL DYSTROPHY
OS_DYSTROPHY: ARCUS SENILIS
OD_DYSTROPHY: ARCUS SENILIS

## 2023-09-19 ASSESSMENT — LID EXAM ASSESSMENTS
OD_BLEPHARITIS: RLL RUL
OS_BLEPHARITIS: LLL LUL

## 2023-09-19 ASSESSMENT — AXIALLENGTH_DERIVED
OS_AL: 23.34
OS_AL: 23.777
OD_AL: 23.3932

## 2023-09-19 ASSESSMENT — REFRACTION_CURRENTRX
OS_AXIS: 085
OS_VPRISM_DIRECTION: PROGS
OD_CYLINDER: -2.00
OD_SPHERE: +1.75
OS_ADD: +2.50
OD_AXIS: 095
OD_VPRISM_DIRECTION: PROGS
OD_ADD: +2.50
OD_OVR_VA: 20/
OS_SPHERE: +0.50
OS_CYLINDER: -1.00
OS_OVR_VA: 20/

## 2023-09-19 ASSESSMENT — REFRACTION_AUTOREFRACTION
OD_SPHERE: +1.50
OS_SPHERE: +2.50
OS_CYLINDER: -2.75
OS_AXIS: 082

## 2023-09-19 ASSESSMENT — CORNEAL DYSTROPHY - BAND KERATOPATHY
OD_BANDKERATOPATHY: 1+
OS_BANDKERATOPATHY: 1+

## 2023-09-19 ASSESSMENT — VISUAL ACUITY
OS_BCVA: 20/30-1
OD_BCVA: 20/25-1

## 2023-09-19 ASSESSMENT — CONFRONTATIONAL VISUAL FIELD TEST (CVF)
OD_FINDINGS: FULL
OS_FINDINGS: FULL

## 2023-09-19 ASSESSMENT — PACHYMETRY
OD_CT_UM: 531
OD_CT_CORRECTION: 1
OS_CT_UM: 527
OS_CT_CORRECTION: 1

## 2023-09-19 ASSESSMENT — KERATOMETRY
OS_K2POWER_DIOPTERS: 43.75
OD_K1POWER_DIOPTERS: 43.00
OS_K1POWER_DIOPTERS: 42.25
OD_K2POWER_DIOPTERS: 43.50
OD_AXISANGLE_DEGREES: 063
OS_AXISANGLE_DEGREES: 089

## 2023-09-19 ASSESSMENT — TONOMETRY
OS_IOP_MMHG: 11
OD_IOP_MMHG: 12

## 2023-09-19 ASSESSMENT — DRY EYES - PHYSICIAN NOTES
OD_GENERALCOMMENTS: LOW TEAR FILM
OS_GENERALCOMMENTS: LOW TEAR FILM

## 2023-09-19 ASSESSMENT — SPHEQUIV_DERIVED
OS_SPHEQUIV: 0
OS_SPHEQUIV: 1.125
OD_SPHEQUIV: 0.75

## 2023-09-19 ASSESSMENT — LID POSITION - DERMATOCHALASIS
OD_DERMATOCHALASIS: RUL 1+
OS_DERMATOCHALASIS: LUL 1+

## 2023-09-19 ASSESSMENT — SUPERFICIAL PUNCTATE KERATITIS (SPK): OS_SPK: 2+

## 2023-11-14 ENCOUNTER — HOSPITAL ENCOUNTER (EMERGENCY)
Facility: HOSPITAL | Age: 83
Discharge: HOME | End: 2023-11-14
Attending: EMERGENCY MEDICINE
Payer: MEDICARE

## 2023-11-14 VITALS
BODY MASS INDEX: 25.73 KG/M2 | HEIGHT: 58 IN | SYSTOLIC BLOOD PRESSURE: 124 MMHG | DIASTOLIC BLOOD PRESSURE: 52 MMHG | HEART RATE: 65 BPM | RESPIRATION RATE: 18 BRPM | WEIGHT: 122.58 LBS | TEMPERATURE: 98.2 F | OXYGEN SATURATION: 96 %

## 2023-11-14 DIAGNOSIS — I10 HIGH BLOOD PRESSURE: ICD-10-CM

## 2023-11-14 DIAGNOSIS — R04.0 ANTERIOR EPISTAXIS: Primary | ICD-10-CM

## 2023-11-14 PROCEDURE — 99282 EMERGENCY DEPT VISIT SF MDM: CPT

## 2023-11-14 RX ORDER — LEVOTHYROXINE SODIUM 0.1 MG/1
100 TABLET ORAL DAILY
COMMUNITY

## 2023-11-14 RX ORDER — CLONIDINE HYDROCHLORIDE 0.1 MG/1
0.2 TABLET ORAL ONCE
Status: COMPLETED | OUTPATIENT
Start: 2023-11-14 | End: 2023-11-14

## 2023-11-14 RX ORDER — ASPIRIN 81 MG/1
81 TABLET ORAL DAILY
COMMUNITY

## 2023-11-14 RX ADMIN — CLONIDINE HYDROCHLORIDE 0.2 MG: 0.1 TABLET ORAL at 09:09

## 2023-11-14 RX ADMIN — SILVER NITRATE APPLICATORS 1 APPLICATOR: 25; 75 STICK TOPICAL at 08:56

## 2023-11-14 NOTE — ED PROVIDER NOTES
History  Chief Complaint   Patient presents with    Nose Bleed     Pt c/o nose bleed starting at 615 and hasn't stopped bleeding. Pt takes 81mg asa qhs. History provided by:  Medical records, patient and relative (Son)  Nose Bleed  Location:  Bilateral  Severity:  Mild  Duration:  1 hour  Timing:  Intermittent  Progression:  Waxing and waning  Chronicity:  New  Context: aspirin use    Context comment:  History of nosebleeds as a young adult, currently on aspirin daily, states she developed some nosebleed bilaterally this morning, packed with tissue paper  Relieved by: Tissue tampons. Worsened by:  Heat (Dry heat radiator in house)  Associated symptoms: blood in oropharynx    Associated symptoms: no congestion, no cough, no dizziness, no facial pain, no fever, no headaches, no sinus pain, no sneezing, no sore throat and no syncope        Prior to Admission Medications   Prescriptions Last Dose Informant Patient Reported? Taking?   aspirin (ECOTRIN LOW STRENGTH) 81 mg EC tablet   Yes No   Sig: Take 81 mg by mouth daily   levothyroxine 100 mcg tablet   Yes No   Sig: Take 100 mcg by mouth daily      Facility-Administered Medications: None       History reviewed. No pertinent past medical history. History reviewed. No pertinent surgical history. History reviewed. No pertinent family history. I have reviewed and agree with the history as documented. E-Cigarette/Vaping    E-Cigarette Use Never User      E-Cigarette/Vaping Substances     Social History     Tobacco Use    Smoking status: Never    Smokeless tobacco: Never   Vaping Use    Vaping Use: Never used   Substance Use Topics    Alcohol use: Never    Drug use: Never       Review of Systems   Constitutional:  Negative for chills, fatigue and fever. HENT:  Positive for nosebleeds. Negative for congestion, ear discharge, ear pain, rhinorrhea, sinus pain, sneezing and sore throat. Eyes:  Negative for pain and visual disturbance.    Respiratory: Negative for cough and shortness of breath. Cardiovascular:  Negative for chest pain, palpitations and syncope. Gastrointestinal:  Negative for abdominal pain, diarrhea, nausea and vomiting. Endocrine: Negative for polydipsia, polyphagia and polyuria. Genitourinary:  Negative for difficulty urinating, dysuria, flank pain and hematuria. Musculoskeletal:  Negative for arthralgias and back pain. Skin:  Negative for color change and rash. Allergic/Immunologic: Negative for immunocompromised state. Neurological:  Negative for dizziness, seizures, syncope, weakness and headaches. Psychiatric/Behavioral:  Negative for confusion and self-injury. The patient is not nervous/anxious. All other systems reviewed and are negative. Physical Exam  Physical Exam  Vitals and nursing note reviewed. Constitutional:       General: She is not in acute distress. Appearance: Normal appearance. She is not ill-appearing, toxic-appearing or diaphoretic. HENT:      Head: Normocephalic and atraumatic. Nose: No congestion or rhinorrhea. Comments: Tissue paper rolled up, removed from both nares. Bilateral anterior septal excoriation, worse on the right-hand side, evidence of fresh clot noted. No active bleeding. Mouth/Throat:      Mouth: Mucous membranes are moist.      Pharynx: Oropharynx is clear. No oropharyngeal exudate or posterior oropharyngeal erythema. Eyes:      General:         Right eye: No discharge. Left eye: No discharge. Extraocular Movements: Extraocular movements intact. Conjunctiva/sclera: Conjunctivae normal.      Pupils: Pupils are equal, round, and reactive to light. Cardiovascular:      Rate and Rhythm: Normal rate and regular rhythm. Pulses: Normal pulses. Heart sounds: Normal heart sounds. No murmur heard. No gallop. Pulmonary:      Effort: Pulmonary effort is normal. No respiratory distress. Breath sounds: Normal breath sounds. No stridor. No wheezing, rhonchi or rales. Chest:      Chest wall: No tenderness. Abdominal:      General: Bowel sounds are normal. There is no distension. Palpations: Abdomen is soft. There is no mass. Tenderness: There is no abdominal tenderness. There is no right CVA tenderness, left CVA tenderness, guarding or rebound. Hernia: No hernia is present. Musculoskeletal:         General: Normal range of motion. Cervical back: Normal range of motion and neck supple. Skin:     General: Skin is warm and dry. Capillary Refill: Capillary refill takes less than 2 seconds. Neurological:      General: No focal deficit present. Mental Status: She is alert and oriented to person, place, and time. Cranial Nerves: No cranial nerve deficit. Sensory: No sensory deficit. Motor: No weakness. Coordination: Coordination normal.      Gait: Gait normal.      Deep Tendon Reflexes: Reflexes normal.   Psychiatric:         Mood and Affect: Mood normal.         Behavior: Behavior normal.         Thought Content:  Thought content normal.         Judgment: Judgment normal.         Vital Signs  ED Triage Vitals [11/14/23 0845]   Temperature Pulse Respirations Blood Pressure SpO2   98.2 °F (36.8 °C) 86 18 (!) 208/100 97 %      Temp src Heart Rate Source Patient Position - Orthostatic VS BP Location FiO2 (%)   -- Monitor Lying Right arm --      Pain Score       No Pain           Vitals:    11/14/23 0900 11/14/23 0909 11/14/23 0956 11/14/23 1000   BP: (!) 194/89 (!) 194/89 140/63 124/52   Pulse: 72   65   Patient Position - Orthostatic VS: Lying   Lying         Visual Acuity      ED Medications  Medications   silver nitrate-potassium nitrate (ARZOL SILVER NITRATE) 88-48 % applicator **ADS Override Pull** (1 applicator Topical Given by Other 11/14/23 0856)   cloNIDine (CATAPRES) tablet 0.2 mg (0.2 mg Oral Given 11/14/23 0660)       Diagnostic Studies  Results Reviewed       None No orders to display              Procedures  Epistaxis management    Date/Time: 11/14/2023 8:54 AM    Performed by: Armaan Rojo MD  Authorized by: Armaan Rojo MD  Universal Protocol:  Consent: Verbal consent obtained. Risks and benefits: risks, benefits and alternatives were discussed  Consent given by: patient and power of   Time out: Immediately prior to procedure a "time out" was called to verify the correct patient, procedure, equipment, support staff and site/side marked as required. Timeout called at: 11/14/2023 8:54 AM.  Patient identity confirmed: verbally with patient, arm band, provided demographic data and hospital-assigned identification number    Patient location:  ED  Procedure details:     Treatment site:  R anterior    Hemostasis method:  Silver nitrate    Approach:  External    Treatment complexity:  Limited    Treatment episode: initial    Post-procedure details:     Assessment:  Bleeding stopped    Patient tolerance of procedure: Tolerated well, no immediate complications           ED Course                               SBIRT 20yo+      Flowsheet Row Most Recent Value   Initial Alcohol Screen: US AUDIT-C     1. How often do you have a drink containing alcohol? 0 Filed at: 11/14/2023 0859   2. How many drinks containing alcohol do you have on a typical day you are drinking? 0 Filed at: 11/14/2023 0859   3a. Male UNDER 65: How often do you have five or more drinks on one occasion? 0 Filed at: 11/14/2023 0859   3b. FEMALE Any Age, or MALE 65+: How often do you have 4 or more drinks on one occassion? 0 Filed at: 11/14/2023 0859   Audit-C Score 0 Filed at: 11/14/2023 2513   ETELVINA: How many times in the past year have you. .. Used an illegal drug or used a prescription medication for non-medical reasons? Never Filed at: 11/14/2023 [id number] [facility] [address]  0922: Patient appears well, vital signs reviewed.   Patient noted to be severely hypertension in triage. Will acute recheck. Patient with findings of anterior epistaxis. No active bleeding however there is stigmata of potential rebleeding at the right anterior nare. Plan to use nitrate cautery, see procedure note for full details. I will observe patient for any rebleeding. 7624: Blood pressure is significantly improved. No rebleeding. Patient is known to Dr. Alta Hamman, follow-up with him if symptoms persist.  Encouraged return to the emergency room if rebleeding occurs and unable to be controlled at home. Follow-up with PCP for further evaluation of hypertension. Stable for discharge. Risk  Prescription drug management. Disposition  Final diagnoses:   Anterior epistaxis   High blood pressure     Time reflects when diagnosis was documented in both MDM as applicable and the Disposition within this note       Time User Action Codes Description Comment    11/14/2023  9:56 AM Dakota Tonyud Add [R04.0] Anterior epistaxis     11/14/2023  9:57 AM Dakota Tonyud Add [I10] High blood pressure           ED Disposition       ED Disposition   Discharge    Condition   Stable    Date/Time   Tue Nov 14, 2023 2000 [facility] discharge to home/self care.                    Follow-up Information       Follow up With Specialties Details Why Contact Info    Kevin Ratliff MD Otolaryngology Schedule an appointment as soon as possible for a visit  As needed [address] Schedule an appointment as soon as possible for a visit  for further evaluation of high blood pressure [address]  [phone]              Discharge Medication List as of 11/14/2023  9:57 AM        CONTINUE these medications which have NOT CHANGED    Details   aspirin (ECOTRIN LOW STRENGTH) 81 mg EC tablet Take 81 mg by mouth daily, Historical Med      levothyroxine 100 mcg tablet Take 100 mcg by mouth daily, Historical Med             No discharge procedures on file.     PDMP Review       None            ED Provider  Electronically Signed by             Sherry Bonilla MD  11/15/23 4119

## 2024-03-19 ENCOUNTER — DOCTOR'S OFFICE (OUTPATIENT)
Dept: URBAN - NONMETROPOLITAN AREA CLINIC 1 | Facility: CLINIC | Age: 84
Setting detail: OPHTHALMOLOGY
End: 2024-03-19
Payer: MEDICARE

## 2024-03-19 ENCOUNTER — RX ONLY (RX ONLY)
Age: 84
End: 2024-03-19

## 2024-03-19 DIAGNOSIS — H04.121: ICD-10-CM

## 2024-03-19 DIAGNOSIS — H43.813: ICD-10-CM

## 2024-03-19 DIAGNOSIS — H40.052: ICD-10-CM

## 2024-03-19 DIAGNOSIS — H04.122: ICD-10-CM

## 2024-03-19 DIAGNOSIS — H35.3131: ICD-10-CM

## 2024-03-19 DIAGNOSIS — H40.013: ICD-10-CM

## 2024-03-19 PROCEDURE — 76514 ECHO EXAM OF EYE THICKNESS: CPT | Performed by: OPHTHALMOLOGY

## 2024-03-19 PROCEDURE — 99213 OFFICE O/P EST LOW 20 MIN: CPT | Performed by: OPHTHALMOLOGY

## 2024-03-19 PROCEDURE — 92083 EXTENDED VISUAL FIELD XM: CPT | Performed by: OPHTHALMOLOGY

## 2024-03-19 ASSESSMENT — LID POSITION - DERMATOCHALASIS
OD_DERMATOCHALASIS: RUL 1+
OS_DERMATOCHALASIS: LUL 1+

## 2024-03-19 ASSESSMENT — LID EXAM ASSESSMENTS
OS_BLEPHARITIS: LLL LUL
OD_BLEPHARITIS: RLL RUL

## 2024-03-26 ENCOUNTER — DOCTOR'S OFFICE (OUTPATIENT)
Dept: URBAN - NONMETROPOLITAN AREA CLINIC 1 | Facility: CLINIC | Age: 84
Setting detail: OPHTHALMOLOGY
End: 2024-03-26

## 2024-03-26 DIAGNOSIS — H52.4: ICD-10-CM

## 2024-03-26 DIAGNOSIS — H52.223: ICD-10-CM

## 2024-03-26 PROCEDURE — 92015 DETERMINE REFRACTIVE STATE: CPT | Performed by: OPTOMETRIST

## 2024-03-26 ASSESSMENT — LID POSITION - DERMATOCHALASIS
OS_DERMATOCHALASIS: LUL 1+
OD_DERMATOCHALASIS: RUL 1+

## 2024-03-26 ASSESSMENT — LID EXAM ASSESSMENTS
OS_BLEPHARITIS: LLL LUL
OD_BLEPHARITIS: RLL RUL

## 2024-09-25 ENCOUNTER — DOCTOR'S OFFICE (OUTPATIENT)
Dept: URBAN - NONMETROPOLITAN AREA CLINIC 1 | Facility: CLINIC | Age: 84
Setting detail: OPHTHALMOLOGY
End: 2024-09-25
Payer: MEDICARE

## 2024-09-25 DIAGNOSIS — H35.3131: ICD-10-CM

## 2024-09-25 DIAGNOSIS — H40.013: ICD-10-CM

## 2024-09-25 DIAGNOSIS — H43.813: ICD-10-CM

## 2024-09-25 PROBLEM — Z96.1 PSEUDOPHAKIA  ; BOTH EYES: Status: ACTIVE | Noted: 2024-09-25

## 2024-09-25 PROCEDURE — 99214 OFFICE O/P EST MOD 30 MIN: CPT | Performed by: OPHTHALMOLOGY

## 2024-09-25 PROCEDURE — 92133 CPTRZD OPH DX IMG PST SGM ON: CPT | Performed by: OPHTHALMOLOGY

## 2024-09-25 ASSESSMENT — REFRACTION_CURRENTRX
OS_ADD: +2.25
OS_AXIS: 085
OS_SPHERE: +0.50
OD_ADD: +2.25
OS_CYLINDER: -1.00
OD_CYLINDER: -2.00
OS_VPRISM_DIRECTION: PROGS
OD_VPRISM_DIRECTION: PROGS
OD_AXIS: 099
OS_OVR_VA: 20/
OD_OVR_VA: 20/
OD_SPHERE: +2.00

## 2024-09-25 ASSESSMENT — VISUAL ACUITY
OS_BCVA: 20/40-1
OD_BCVA: 20/30-2

## 2024-09-25 ASSESSMENT — REFRACTION_AUTOREFRACTION
OD_SPHERE: +1.50
OS_CYLINDER: -2.75
OS_SPHERE: +2.00
OS_AXIS: 76

## 2024-09-25 ASSESSMENT — REFRACTION_MANIFEST
OD_CYLINDER: -2.25
OD_VA2: 20/40+1
OD_VA1: 20/40+1
OS_ADD: +2.50
OD_SPHERE: +1.75
OD_ADD: +2.50
OS_AXIS: 085
OD_AXIS: 095
OS_SPHERE: +0.75
OS_VA1: 20/30+2
OS_CYLINDER: -1.25
OS_VA2: 20/30+2

## 2024-09-25 ASSESSMENT — CONFRONTATIONAL VISUAL FIELD TEST (CVF)
OS_FINDINGS: FULL
OD_FINDINGS: FULL

## 2024-09-25 ASSESSMENT — KERATOMETRY
OD_AXISANGLE_DEGREES: 175
OS_K2POWER_DIOPTERS: 45.50
OD_K2POWER_DIOPTERS: 44.75
OS_AXISANGLE_DEGREES: 163
OD_K1POWER_DIOPTERS: 43.50
OS_K1POWER_DIOPTERS: 43.50

## 2024-09-25 ASSESSMENT — PACHYMETRY
OD_CT_UM: 531
OS_CT_CORRECTION: 1
OD_CT_CORRECTION: 1
OS_CT_UM: 527

## 2024-09-25 ASSESSMENT — DRY EYES - PHYSICIAN NOTES
OD_GENERALCOMMENTS: LOW TEAR FILM
OS_GENERALCOMMENTS: LOW TEAR FILM

## 2024-09-25 ASSESSMENT — CORNEAL DYSTROPHY
OD_DYSTROPHY: ARCUS SENILIS
OS_DYSTROPHY: ARCUS SENILIS

## 2024-09-25 ASSESSMENT — CORNEAL DYSTROPHY - BAND KERATOPATHY
OD_BANDKERATOPATHY: 1+
OS_BANDKERATOPATHY: 1+

## 2024-09-25 ASSESSMENT — LID EXAM ASSESSMENTS
OS_BLEPHARITIS: LLL LUL
OD_BLEPHARITIS: RLL RUL

## 2024-09-25 ASSESSMENT — TONOMETRY
OS_IOP_MMHG: 13
OD_IOP_MMHG: 11

## 2024-09-25 ASSESSMENT — LID POSITION - DERMATOCHALASIS
OS_DERMATOCHALASIS: LUL 1+
OD_DERMATOCHALASIS: RUL 1+

## 2024-09-25 ASSESSMENT — SUPERFICIAL PUNCTATE KERATITIS (SPK): OS_SPK: 2+

## 2025-04-22 ENCOUNTER — DOCTOR'S OFFICE (OUTPATIENT)
Dept: URBAN - NONMETROPOLITAN AREA CLINIC 1 | Facility: CLINIC | Age: 85
Setting detail: OPHTHALMOLOGY
End: 2025-04-22
Payer: MEDICARE

## 2025-04-22 DIAGNOSIS — H35.3131: ICD-10-CM

## 2025-04-22 DIAGNOSIS — H04.123: ICD-10-CM

## 2025-04-22 DIAGNOSIS — H40.013: ICD-10-CM

## 2025-04-22 DIAGNOSIS — H43.813: ICD-10-CM

## 2025-04-22 PROCEDURE — 92083 EXTENDED VISUAL FIELD XM: CPT | Performed by: OPHTHALMOLOGY

## 2025-04-22 PROCEDURE — 92020 GONIOSCOPY: CPT | Performed by: OPHTHALMOLOGY

## 2025-04-22 PROCEDURE — 92014 COMPRE OPH EXAM EST PT 1/>: CPT | Performed by: OPHTHALMOLOGY

## 2025-04-22 ASSESSMENT — KERATOMETRY
OD_AXISANGLE_DEGREES: 160
OD_K1POWER_DIOPTERS: 43.00
OS_K1POWER_DIOPTERS: 40.75
OD_K2POWER_DIOPTERS: 43.75
OS_AXISANGLE_DEGREES: 180
OS_K2POWER_DIOPTERS: 44.00

## 2025-04-22 ASSESSMENT — REFRACTION_CURRENTRX
OS_ADD: +2.50
OS_AXIS: 083
OS_SPHERE: +0.50
OD_ADD: +2.50
OS_VPRISM_DIRECTION: PROGS
OD_OVR_VA: 20/
OD_AXIS: 100
OS_CYLINDER: -1.00
OD_SPHERE: +1.50
OD_CYLINDER: -2.00
OD_VPRISM_DIRECTION: PROGS
OS_OVR_VA: 20/

## 2025-04-22 ASSESSMENT — REFRACTION_MANIFEST
OD_ADD: +2.50
OS_VA2: 20/30+2
OD_VA1: 20/40+1
OD_CYLINDER: -2.25
OS_CYLINDER: -1.25
OS_SPHERE: +0.75
OD_AXIS: 095
OS_ADD: +2.50
OD_VA2: 20/40+1
OS_VA1: 20/30+2
OS_AXIS: 085
OD_SPHERE: +1.75

## 2025-04-22 ASSESSMENT — TONOMETRY: OS_IOP_MMHG: 14

## 2025-04-22 ASSESSMENT — PACHYMETRY
OS_CT_CORRECTION: 1
OD_CT_UM: 531
OD_CT_CORRECTION: 1
OS_CT_UM: 527

## 2025-04-22 ASSESSMENT — REFRACTION_AUTOREFRACTION
OS_AXIS: 082
OS_SPHERE: +1.75
OD_CYLINDER: -2.50
OS_CYLINDER: -2.25
OD_AXIS: 098
OD_SPHERE: +2.50

## 2025-04-22 ASSESSMENT — CONFRONTATIONAL VISUAL FIELD TEST (CVF)
OS_FINDINGS: FULL
OD_FINDINGS: FULL

## 2025-04-22 ASSESSMENT — LID EXAM ASSESSMENTS
OS_BLEPHARITIS: LLL LUL
OD_BLEPHARITIS: RLL RUL

## 2025-04-22 ASSESSMENT — VISUAL ACUITY
OD_BCVA: 20/40
OS_BCVA: 20/50+2

## 2025-04-22 ASSESSMENT — DRY EYES - PHYSICIAN NOTES
OD_GENERALCOMMENTS: LOW TEAR FILM
OS_GENERALCOMMENTS: LOW TEAR FILM

## 2025-04-22 ASSESSMENT — SUPERFICIAL PUNCTATE KERATITIS (SPK): OS_SPK: 2+

## 2025-04-22 ASSESSMENT — CORNEAL DYSTROPHY - BAND KERATOPATHY
OS_BANDKERATOPATHY: 1+
OD_BANDKERATOPATHY: 1+

## 2025-04-22 ASSESSMENT — LID POSITION - DERMATOCHALASIS
OS_DERMATOCHALASIS: LUL 1+
OD_DERMATOCHALASIS: RUL 1+

## 2025-04-22 ASSESSMENT — CORNEAL DYSTROPHY
OD_DYSTROPHY: ARCUS SENILIS
OS_DYSTROPHY: ARCUS SENILIS

## 2025-08-20 ENCOUNTER — DOCTOR'S OFFICE (OUTPATIENT)
Dept: URBAN - NONMETROPOLITAN AREA CLINIC 1 | Facility: CLINIC | Age: 85
Setting detail: OPHTHALMOLOGY
End: 2025-08-20
Payer: MEDICARE

## 2025-08-20 DIAGNOSIS — H01.114: ICD-10-CM

## 2025-08-20 DIAGNOSIS — H01.111: ICD-10-CM

## 2025-08-20 DIAGNOSIS — H01.115: ICD-10-CM

## 2025-08-20 DIAGNOSIS — H01.112: ICD-10-CM

## 2025-08-20 PROCEDURE — 92012 INTRM OPH EXAM EST PATIENT: CPT | Performed by: OPTOMETRIST

## 2025-08-20 ASSESSMENT — REFRACTION_AUTOREFRACTION
OS_AXIS: 082
OS_SPHERE: +1.75
OD_CYLINDER: -2.50
OD_SPHERE: +2.50
OS_CYLINDER: -2.25
OD_AXIS: 098

## 2025-08-20 ASSESSMENT — REFRACTION_CURRENTRX
OS_OVR_VA: 20/
OS_ADD: +2.50
OD_CYLINDER: -2.00
OS_CYLINDER: -1.00
OS_VPRISM_DIRECTION: PROGS
OD_AXIS: 100
OS_AXIS: 083
OD_OVR_VA: 20/
OS_SPHERE: +0.50
OD_VPRISM_DIRECTION: PROGS
OD_SPHERE: +1.50
OD_ADD: +2.50

## 2025-08-20 ASSESSMENT — VISUAL ACUITY
OS_BCVA: 20/40
OD_BCVA: 20/25-1

## 2025-08-20 ASSESSMENT — REFRACTION_MANIFEST
OS_SPHERE: +0.75
OD_ADD: +2.50
OD_AXIS: 095
OS_ADD: +2.50
OD_SPHERE: +1.75
OS_CYLINDER: -1.25
OS_VA2: 20/30+2
OD_VA2: 20/40+1
OS_AXIS: 085
OS_VA1: 20/30+2
OD_VA1: 20/40+1
OD_CYLINDER: -2.25

## 2025-08-20 ASSESSMENT — TONOMETRY
OS_IOP_MMHG: 14
OD_IOP_MMHG: 14

## 2025-08-20 ASSESSMENT — KERATOMETRY
OS_AXISANGLE_DEGREES: 180
OS_K2POWER_DIOPTERS: 44.00
OD_K1POWER_DIOPTERS: 43.00
OD_K2POWER_DIOPTERS: 43.75
OS_K1POWER_DIOPTERS: 40.75
OD_AXISANGLE_DEGREES: 160

## 2025-08-20 ASSESSMENT — LID EXAM ASSESSMENTS
OS_COMMENTS: &GT
OD_BLEPHARITIS: RLL RUL
OD_COMMENTS: OS
OD_COMMENTS: &GT
OS_BLEPHARITIS: LLL LUL
OS_COMMENTS: OS

## 2025-08-20 ASSESSMENT — DRY EYES - PHYSICIAN NOTES
OS_GENERALCOMMENTS: LOW TEAR FILM
OD_GENERALCOMMENTS: LOW TEAR FILM

## 2025-08-20 ASSESSMENT — CONFRONTATIONAL VISUAL FIELD TEST (CVF)
OS_FINDINGS: FULL
OD_FINDINGS: FULL

## 2025-08-20 ASSESSMENT — LID POSITION - DERMATOCHALASIS
OS_DERMATOCHALASIS: LUL 1+
OD_DERMATOCHALASIS: RUL 1+

## 2025-08-20 ASSESSMENT — PACHYMETRY
OS_CT_CORRECTION: 1
OS_CT_UM: 527
OD_CT_UM: 531
OD_CT_CORRECTION: 1

## 2025-08-20 ASSESSMENT — CORNEAL DYSTROPHY - BAND KERATOPATHY
OD_BANDKERATOPATHY: 1+
OS_BANDKERATOPATHY: 1+

## 2025-08-20 ASSESSMENT — CORNEAL DYSTROPHY
OS_DYSTROPHY: ARCUS SENILIS
OD_DYSTROPHY: ARCUS SENILIS

## 2025-08-20 ASSESSMENT — SUPERFICIAL PUNCTATE KERATITIS (SPK): OS_SPK: 2+

## 2025-08-27 ENCOUNTER — RX ONLY (RX ONLY)
Age: 85
End: 2025-08-27

## 2025-08-27 ENCOUNTER — DOCTOR'S OFFICE (OUTPATIENT)
Dept: URBAN - NONMETROPOLITAN AREA CLINIC 1 | Facility: CLINIC | Age: 85
Setting detail: OPHTHALMOLOGY
End: 2025-08-27
Payer: MEDICARE

## 2025-08-27 DIAGNOSIS — H01.114: ICD-10-CM

## 2025-08-27 DIAGNOSIS — H01.111: ICD-10-CM

## 2025-08-27 DIAGNOSIS — H00.11: ICD-10-CM

## 2025-08-27 DIAGNOSIS — H01.112: ICD-10-CM

## 2025-08-27 PROBLEM — H01.005 BLEPHARITIS; RIGHT UPPER LID, RIGHT LOWER LID, LEFT UPPER LID, LEFT LOWER LID: Status: ACTIVE | Noted: 2025-08-20

## 2025-08-27 PROBLEM — H01.002 BLEPHARITIS; RIGHT UPPER LID, RIGHT LOWER LID, LEFT UPPER LID, LEFT LOWER LID: Status: ACTIVE | Noted: 2025-08-20

## 2025-08-27 PROBLEM — H01.004 BLEPHARITIS; RIGHT UPPER LID, RIGHT LOWER LID, LEFT UPPER LID, LEFT LOWER LID: Status: ACTIVE | Noted: 2025-08-20

## 2025-08-27 PROBLEM — H01.115: Status: RESOLVED | Noted: 2025-08-20 | Resolved: 2025-08-27

## 2025-08-27 PROBLEM — H01.001 BLEPHARITIS; RIGHT UPPER LID, RIGHT LOWER LID, LEFT UPPER LID, LEFT LOWER LID: Status: ACTIVE | Noted: 2025-08-20

## 2025-08-27 PROCEDURE — 92012 INTRM OPH EXAM EST PATIENT: CPT | Performed by: OPTOMETRIST

## 2025-08-27 ASSESSMENT — SUPERFICIAL PUNCTATE KERATITIS (SPK): OS_SPK: 2+

## 2025-08-27 ASSESSMENT — REFRACTION_CURRENTRX
OS_OVR_VA: 20/
OD_ADD: +2.50
OS_SPHERE: +0.50
OD_OVR_VA: 20/
OS_ADD: +2.50
OS_CYLINDER: -1.00
OD_AXIS: 100
OS_AXIS: 083
OD_SPHERE: +1.50
OD_CYLINDER: -2.00
OD_VPRISM_DIRECTION: PROGS
OS_VPRISM_DIRECTION: PROGS

## 2025-08-27 ASSESSMENT — REFRACTION_MANIFEST
OD_SPHERE: +1.75
OD_VA1: 20/40+1
OD_VA2: 20/40+1
OS_AXIS: 085
OS_SPHERE: +0.75
OS_ADD: +2.50
OS_VA2: 20/30+2
OD_AXIS: 095
OS_CYLINDER: -1.25
OS_VA1: 20/30+2
OD_CYLINDER: -2.25
OD_ADD: +2.50

## 2025-08-27 ASSESSMENT — REFRACTION_AUTOREFRACTION
OS_SPHERE: +2.75
OS_CYLINDER: -3.25
OD_CYLINDER: -4.00
OD_AXIS: 099
OD_SPHERE: +3.50
OS_AXIS: 089

## 2025-08-27 ASSESSMENT — DRY EYES - PHYSICIAN NOTES
OS_GENERALCOMMENTS: LOW TEAR FILM
OD_GENERALCOMMENTS: LOW TEAR FILM

## 2025-08-27 ASSESSMENT — CONFRONTATIONAL VISUAL FIELD TEST (CVF)
OD_FINDINGS: FULL
OS_FINDINGS: FULL

## 2025-08-27 ASSESSMENT — TONOMETRY
OD_IOP_MMHG: 14
OS_IOP_MMHG: 14

## 2025-08-27 ASSESSMENT — LID POSITION - DERMATOCHALASIS
OD_DERMATOCHALASIS: RUL 1+
OS_DERMATOCHALASIS: LUL 1+

## 2025-08-27 ASSESSMENT — CORNEAL DYSTROPHY
OD_DYSTROPHY: ARCUS SENILIS
OS_DYSTROPHY: ARCUS SENILIS

## 2025-08-27 ASSESSMENT — VISUAL ACUITY
OS_BCVA: 20/50
OD_BCVA: 20/30+2

## 2025-08-27 ASSESSMENT — PACHYMETRY
OS_CT_UM: 527
OD_CT_UM: 531
OD_CT_CORRECTION: 1
OS_CT_CORRECTION: 1

## 2025-08-27 ASSESSMENT — LID EXAM ASSESSMENTS
OD_BLEPHARITIS: RLL RUL
OS_BLEPHARITIS: LLL LUL

## 2025-08-27 ASSESSMENT — CORNEAL DYSTROPHY - BAND KERATOPATHY
OD_BANDKERATOPATHY: 1+
OS_BANDKERATOPATHY: 1+

## 2025-08-27 ASSESSMENT — KERATOMETRY
OD_K1POWER_DIOPTERS: 43.00
OS_K2POWER_DIOPTERS: 44.00
OS_K1POWER_DIOPTERS: 40.75
OD_K2POWER_DIOPTERS: 43.75
OS_AXISANGLE_DEGREES: 180
OD_AXISANGLE_DEGREES: 160